# Patient Record
Sex: FEMALE | Race: WHITE | NOT HISPANIC OR LATINO | Employment: UNEMPLOYED | URBAN - NONMETROPOLITAN AREA
[De-identification: names, ages, dates, MRNs, and addresses within clinical notes are randomized per-mention and may not be internally consistent; named-entity substitution may affect disease eponyms.]

---

## 2018-11-01 ENCOUNTER — OFFICE VISIT (OUTPATIENT)
Dept: CARDIOLOGY | Facility: CLINIC | Age: 48
End: 2018-11-01

## 2018-11-01 VITALS
BODY MASS INDEX: 59.71 KG/M2 | HEART RATE: 89 BPM | SYSTOLIC BLOOD PRESSURE: 142 MMHG | DIASTOLIC BLOOD PRESSURE: 84 MMHG | OXYGEN SATURATION: 96 % | WEIGHT: 258 LBS | HEIGHT: 55 IN

## 2018-11-01 DIAGNOSIS — E78.2 MIXED HYPERLIPIDEMIA: ICD-10-CM

## 2018-11-01 DIAGNOSIS — I10 ESSENTIAL HYPERTENSION: ICD-10-CM

## 2018-11-01 DIAGNOSIS — Z91.89 MULTIPLE RISK FACTORS FOR CORONARY ARTERY DISEASE: ICD-10-CM

## 2018-11-01 DIAGNOSIS — R07.9 CHEST PAIN IN ADULT: Primary | ICD-10-CM

## 2018-11-01 DIAGNOSIS — Z72.0 TOBACCO USE: ICD-10-CM

## 2018-11-01 PROBLEM — Z82.49 FAMILY HISTORY OF PREMATURE CORONARY ARTERY DISEASE: Status: ACTIVE | Noted: 2018-11-01

## 2018-11-01 PROBLEM — E66.9 OBESITY: Status: ACTIVE | Noted: 2018-11-01

## 2018-11-01 PROBLEM — F41.1 GENERALIZED ANXIETY DISORDER: Status: ACTIVE | Noted: 2018-11-01

## 2018-11-01 PROCEDURE — 93000 ELECTROCARDIOGRAM COMPLETE: CPT | Performed by: INTERNAL MEDICINE

## 2018-11-01 PROCEDURE — 99204 OFFICE O/P NEW MOD 45 MIN: CPT | Performed by: INTERNAL MEDICINE

## 2018-11-01 RX ORDER — ERGOCALCIFEROL 1.25 MG/1
50000 CAPSULE ORAL WEEKLY
COMMUNITY

## 2018-11-01 RX ORDER — CITALOPRAM 20 MG/1
20 TABLET ORAL DAILY
COMMUNITY

## 2018-11-01 RX ORDER — HYDROCHLOROTHIAZIDE 12.5 MG/1
12.5 CAPSULE, GELATIN COATED ORAL DAILY
Qty: 90 CAPSULE | Refills: 3 | Status: SHIPPED | OUTPATIENT
Start: 2018-11-01 | End: 2022-02-09

## 2018-11-01 RX ORDER — BUPROPION HYDROCHLORIDE 150 MG/1
150 TABLET ORAL DAILY
COMMUNITY

## 2018-11-01 RX ORDER — CETIRIZINE HYDROCHLORIDE 10 MG/1
10 TABLET ORAL DAILY
COMMUNITY

## 2018-11-01 RX ORDER — ATORVASTATIN CALCIUM 20 MG/1
20 TABLET, FILM COATED ORAL DAILY
COMMUNITY

## 2018-11-01 RX ORDER — BUSPIRONE HYDROCHLORIDE 10 MG/1
10 TABLET ORAL 3 TIMES DAILY
COMMUNITY

## 2018-11-01 RX ORDER — ESCITALOPRAM OXALATE 20 MG/1
20 TABLET ORAL DAILY
COMMUNITY

## 2018-11-01 RX ORDER — LISINOPRIL 40 MG/1
40 TABLET ORAL DAILY
COMMUNITY
End: 2023-03-09

## 2018-11-01 RX ORDER — QUETIAPINE FUMARATE 100 MG/1
100 TABLET, FILM COATED ORAL NIGHTLY
COMMUNITY

## 2018-11-01 RX ORDER — AMLODIPINE BESYLATE 10 MG/1
10 TABLET ORAL DAILY
COMMUNITY

## 2018-11-01 RX ORDER — BUPRENORPHINE HYDROCHLORIDE AND NALOXONE HYDROCHLORIDE DIHYDRATE 8; 2 MG/1; MG/1
1 TABLET SUBLINGUAL DAILY
COMMUNITY

## 2018-11-01 NOTE — PROGRESS NOTES
Subjective   Chief Complaint   Patient presents with   • Hypertension   • New Patient       History of Present Illness  Patient is 48 years old white female who was multiple risk factors for premature coronary artery disease.  She states that she developed indigestion and heartburn in the epigastric area which radiated up the sternum.  It was accompanied by diaphoresis and shortness of breath.  There was no radiation of the pain to the arm.  It was not related to physical exertion.  Quality of the pain was tight burning feeling no sharp chest pain.  Severity was about 6 out of 10.  It does not regularly And with exercise.    Patient has multiple risk factors for coronary artery disease including   Obesity  Tobacco use  Essential hypertension  Upper lipidemia  And family history of premature coronary artery disease.  Her maternal grandmother  age 49 with massive MI.    Blood pressure is not very well controlled at this time  She went to the emergency room where blood pressure was high however she was treated for chest pain and was recommended to have a stress test done or blood pressure medications were not adjusted.    Lipids according to her are controlled with Lipitor.    Patient continues to smoke also.  She has lot of anxiety issues.    Past Surgical History:   Procedure Laterality Date   • ANKLE SURGERY     •  SECTION     • HYSTERECTOMY       Family History   Problem Relation Age of Onset   • Heart disease Mother      Past Medical History:   Diagnosis Date   • Depression    • Hypertension        Patient Active Problem List   Diagnosis   • Chest pain in adult   • Essential hypertension   • Mixed hyperlipidemia   • Generalized anxiety disorder   • Obesity   • Tobacco use   • Family history of premature coronary artery disease   • Multiple risk factors for coronary artery disease         Social History   Substance Use Topics   • Smoking status: Current Every Day Smoker     Packs/day: 0.50     Years:  15.00     Types: Cigarettes   • Smokeless tobacco: Never Used   • Alcohol use No         The following portions of the patient's history were reviewed and updated as appropriate: allergies, current medications, past family history, past medical history, past social history, past surgical history and problem list.    No Known Allergies      Current Outpatient Prescriptions:   •  amLODIPine (NORVASC) 10 MG tablet, Take 10 mg by mouth Daily., Disp: , Rfl:   •  atorvastatin (LIPITOR) 20 MG tablet, Take 20 mg by mouth Daily., Disp: , Rfl:   •  buprenorphine-naloxone (SUBOXONE) 8-2 MG per SL tablet, Place 1 tablet under the tongue Daily., Disp: , Rfl:   •  buPROPion XL (WELLBUTRIN XL) 150 MG 24 hr tablet, Take 150 mg by mouth Daily., Disp: , Rfl:   •  busPIRone (BUSPAR) 10 MG tablet, Take 10 mg by mouth 3 (Three) Times a Day., Disp: , Rfl:   •  cetirizine (zyrTEC) 10 MG tablet, Take 10 mg by mouth Daily., Disp: , Rfl:   •  citalopram (CeleXA) 20 MG tablet, Take 20 mg by mouth Daily., Disp: , Rfl:   •  escitalopram (LEXAPRO) 20 MG tablet, Take 20 mg by mouth Daily., Disp: , Rfl:   •  lisinopril (PRINIVIL,ZESTRIL) 40 MG tablet, Take 40 mg by mouth Daily., Disp: , Rfl:   •  Naloxegol Oxalate (MOVANTIK) 25 MG tablet, Take 25 mg by mouth Every Morning., Disp: , Rfl:   •  QUEtiapine (SEROquel) 100 MG tablet, Take 100 mg by mouth Every Night., Disp: , Rfl:   •  vitamin D (ERGOCALCIFEROL) 53947 units capsule capsule, Take 50,000 Units by mouth 1 (One) Time Per Week., Disp: , Rfl:   •  hydrochlorothiazide (MICROZIDE) 12.5 MG capsule, Take 1 capsule by mouth Daily., Disp: 90 capsule, Rfl: 3    Review of Systems   Constitution: Negative.   HENT: Negative.  Negative for congestion.    Eyes: Negative.    Cardiovascular: Positive for chest pain and dyspnea on exertion. Negative for cyanosis, irregular heartbeat, leg swelling, near-syncope, orthopnea, palpitations, paroxysmal nocturnal dyspnea and syncope.   Respiratory: Positive for  "shortness of breath.    Hematologic/Lymphatic: Negative.    Musculoskeletal: Negative.    Gastrointestinal: Positive for heartburn.   Neurological: Negative.  Negative for headaches.   Psychiatric/Behavioral: The patient is nervous/anxious.         Objective      /84 (BP Location: Left arm, Patient Position: Sitting)   Pulse 89   Ht 63 cm (24.8\")   Wt 117 kg (258 lb)   SpO2 96%   .86 kg/m²     Physical Exam   Constitutional: She appears well-developed and well-nourished. No distress.   HENT:   Head: Normocephalic and atraumatic.   Mouth/Throat: Oropharynx is clear and moist. No oropharyngeal exudate.   Eyes: Pupils are equal, round, and reactive to light. Conjunctivae and EOM are normal. No scleral icterus.   Neck: Normal range of motion. Neck supple. No JVD present. No tracheal deviation present. No thyromegaly present.   Cardiovascular: Normal rate, regular rhythm, normal heart sounds and intact distal pulses.  PMI is not displaced.  Exam reveals no gallop, no friction rub and no decreased pulses.    No murmur heard.  Pulses:       Carotid pulses are 3+ on the right side, and 3+ on the left side.       Radial pulses are 3+ on the right side, and 3+ on the left side.   Pulmonary/Chest: Effort normal and breath sounds normal. No respiratory distress. She has no wheezes. She has no rales. She exhibits no tenderness.   Abdominal: Soft. Bowel sounds are normal. She exhibits no distension, no abdominal bruit and no mass. There is no splenomegaly or hepatomegaly. There is no tenderness. There is no rebound and no guarding.   Musculoskeletal: Normal range of motion. She exhibits no edema, tenderness or deformity.   Lymphadenopathy:     She has no cervical adenopathy.   Neurological: She is alert. She has normal reflexes. No cranial nerve deficit. She exhibits normal muscle tone. Coordination normal.   Skin: Skin is warm and dry. No rash noted. She is not diaphoretic. No erythema.   Psychiatric: She has a " normal mood and affect. Her behavior is normal. Judgment and thought content normal.       Lab Review:      ECG 12 Lead  Date/Time: 11/1/2018 1:06 PM  Performed by: BRYCE ARBOLEDA  Authorized by: BRYCE ARBOLEDA   Rhythm: sinus rhythm  Rate: normal  BPM: 74  Conduction: conduction normal  ST Segments: ST segments normal  T Waves: T waves normal  QRS axis: normal  Other: no other findings  Clinical impression: normal ECG            I reviewed the patient's new clinical results.  I personally viewed and interpreted the patient's EKG/lab data        Assessment:   Diagnosis Plan   1. Chest pain in adult  ECG 12 Lead    ECG 12 Lead   2. Mixed hyperlipidemia     3. Essential hypertension     4. Tobacco use     5. Multiple risk factors for coronary artery disease            Plan:  Patient is 48 years old white female who is here for evaluation of chest pain.  She was seen in the emergency room and stress test was recommended.    Her blood pressure is also not very well controlled.  Patient has multiple cardiac risk factors.  Blood pressure medications were adjusted  Hydrochlorothiazide 12.5 mg was added.  Echo and stress test was scheduled for further evaluation.  Healthy lifestyle was discussed including weight loss, exercise program and cessation of tobacco use    Patient be reevaluated after studies are completed    Thank you for giving me the oppertunity to participate in your patient's cardiac care.    Sincerely,    SAVANNAH Arboleda M.D. FACP [facility]     No Follow-up on file.

## 2018-11-28 ENCOUNTER — APPOINTMENT (OUTPATIENT)
Dept: NUCLEAR MEDICINE | Facility: HOSPITAL | Age: 48
End: 2018-11-28
Attending: INTERNAL MEDICINE

## 2018-11-28 ENCOUNTER — APPOINTMENT (OUTPATIENT)
Dept: CARDIOLOGY | Facility: HOSPITAL | Age: 48
End: 2018-11-28
Attending: INTERNAL MEDICINE

## 2019-02-18 ENCOUNTER — OFFICE VISIT (OUTPATIENT)
Dept: CARDIOLOGY | Facility: CLINIC | Age: 49
End: 2019-02-18

## 2019-02-18 VITALS
BODY MASS INDEX: 46.78 KG/M2 | SYSTOLIC BLOOD PRESSURE: 152 MMHG | WEIGHT: 264 LBS | OXYGEN SATURATION: 95 % | HEART RATE: 86 BPM | DIASTOLIC BLOOD PRESSURE: 98 MMHG | HEIGHT: 63 IN

## 2019-02-18 DIAGNOSIS — E66.01 CLASS 3 SEVERE OBESITY WITHOUT SERIOUS COMORBIDITY WITH BODY MASS INDEX (BMI) OF 45.0 TO 49.9 IN ADULT, UNSPECIFIED OBESITY TYPE (HCC): ICD-10-CM

## 2019-02-18 DIAGNOSIS — Z72.0 TOBACCO USE: ICD-10-CM

## 2019-02-18 DIAGNOSIS — Z91.89 MULTIPLE RISK FACTORS FOR CORONARY ARTERY DISEASE: ICD-10-CM

## 2019-02-18 DIAGNOSIS — R07.9 CHEST PAIN IN ADULT: ICD-10-CM

## 2019-02-18 DIAGNOSIS — Z82.49 FAMILY HISTORY OF PREMATURE CORONARY ARTERY DISEASE: ICD-10-CM

## 2019-02-18 DIAGNOSIS — E78.2 MIXED HYPERLIPIDEMIA: ICD-10-CM

## 2019-02-18 DIAGNOSIS — I10 ESSENTIAL HYPERTENSION: Primary | ICD-10-CM

## 2019-02-18 PROCEDURE — 99214 OFFICE O/P EST MOD 30 MIN: CPT | Performed by: INTERNAL MEDICINE

## 2019-02-18 RX ORDER — LABETALOL 100 MG/1
50 TABLET, FILM COATED ORAL 2 TIMES DAILY
Qty: 90 TABLET | Refills: 0 | Status: SHIPPED | OUTPATIENT
Start: 2019-02-18 | End: 2019-03-25 | Stop reason: ALTCHOICE

## 2019-02-18 NOTE — PROGRESS NOTES
subjective     Chief Complaint   Patient presents with   • Chest Pain     Missed Appt for Stress and Echo      History of Present Illness  Patient is 48 years old white female who has history of chest pain.  Patient was scheduled to have echo and stress test done but she missed the appointment.    She states her blood pressure has been running high.  She was given hydrochlorothiazide last time.  She is not taking it because it did not diurese her very well.    Patient did not lose any weight  She continues to smoke  Patient has multiple cardiac risk factors including obesity, hypertension, hyperlipidemia, tobacco use and strong family history of premature coronary artery disease    Past Surgical History:   Procedure Laterality Date   • ANKLE SURGERY     •  SECTION     • HYSTERECTOMY       Family History   Problem Relation Age of Onset   • Heart disease Mother      Past Medical History:   Diagnosis Date   • Depression    • Hypertension      Patient Active Problem List   Diagnosis   • Chest pain in adult   • Essential hypertension   • Mixed hyperlipidemia   • Generalized anxiety disorder   • Obesity   • Tobacco use   • Family history of premature coronary artery disease   • Multiple risk factors for coronary artery disease       Social History     Tobacco Use   • Smoking status: Current Every Day Smoker     Packs/day: 0.50     Years: 15.00     Pack years: 7.50     Types: Cigarettes   • Smokeless tobacco: Never Used   Substance Use Topics   • Alcohol use: No   • Drug use: Yes     Types: Benzodiazepines       No Known Allergies    Current Outpatient Medications on File Prior to Visit   Medication Sig   • amLODIPine (NORVASC) 10 MG tablet Take 10 mg by mouth Daily.   • atorvastatin (LIPITOR) 20 MG tablet Take 20 mg by mouth Daily.   • buprenorphine-naloxone (SUBOXONE) 8-2 MG per SL tablet Place 1 tablet under the tongue Daily.   • buPROPion XL (WELLBUTRIN XL) 150 MG 24 hr tablet Take 150 mg by mouth Daily.   •  "busPIRone (BUSPAR) 10 MG tablet Take 10 mg by mouth 3 (Three) Times a Day.   • cetirizine (zyrTEC) 10 MG tablet Take 10 mg by mouth Daily.   • citalopram (CeleXA) 20 MG tablet Take 20 mg by mouth Daily.   • escitalopram (LEXAPRO) 20 MG tablet Take 20 mg by mouth Daily.   • hydrochlorothiazide (MICROZIDE) 12.5 MG capsule Take 1 capsule by mouth Daily.   • lisinopril (PRINIVIL,ZESTRIL) 40 MG tablet Take 40 mg by mouth Daily.   • Naloxegol Oxalate (MOVANTIK) 25 MG tablet Take 25 mg by mouth Every Morning.   • QUEtiapine (SEROquel) 100 MG tablet Take 100 mg by mouth Every Night.   • vitamin D (ERGOCALCIFEROL) 71760 units capsule capsule Take 50,000 Units by mouth 1 (One) Time Per Week.     No current facility-administered medications on file prior to visit.          The following portions of the patient's history were reviewed and updated as appropriate: allergies, current medications, past family history, past medical history, past social history, past surgical history and problem list.    Review of Systems   Constitution: Negative.   HENT: Negative.  Negative for congestion.    Eyes: Negative.    Cardiovascular: Positive for chest pain. Negative for cyanosis, dyspnea on exertion, irregular heartbeat, leg swelling, near-syncope, orthopnea, palpitations, paroxysmal nocturnal dyspnea and syncope.   Respiratory: Negative.  Negative for shortness of breath.    Hematologic/Lymphatic: Negative.    Musculoskeletal: Negative.    Gastrointestinal: Negative.    Neurological: Negative.  Negative for headaches.          Objective:     /98 (BP Location: Left arm, Patient Position: Sitting)   Pulse 86   Ht 160 cm (63\")   Wt 120 kg (264 lb)   SpO2 95%   BMI 46.77 kg/m²   Physical Exam   Constitutional: She appears well-developed and well-nourished. No distress.   HENT:   Head: Normocephalic and atraumatic.   Mouth/Throat: Oropharynx is clear and moist. No oropharyngeal exudate.   Eyes: Conjunctivae and EOM are normal. " Pupils are equal, round, and reactive to light. No scleral icterus.   Neck: Normal range of motion. Neck supple. No JVD present. No tracheal deviation present. No thyromegaly present.   Cardiovascular: Normal rate, regular rhythm, normal heart sounds and intact distal pulses. PMI is not displaced. Exam reveals no gallop, no friction rub and no decreased pulses.   No murmur heard.  Pulses:       Carotid pulses are 3+ on the right side, and 3+ on the left side.       Radial pulses are 3+ on the right side, and 3+ on the left side.   Pulmonary/Chest: Effort normal and breath sounds normal. No respiratory distress. She has no wheezes. She has no rales. She exhibits no tenderness.   Abdominal: Soft. Bowel sounds are normal. She exhibits no distension, no abdominal bruit and no mass. There is no splenomegaly or hepatomegaly. There is no tenderness. There is no rebound and no guarding.   Musculoskeletal: Normal range of motion. She exhibits no edema, tenderness or deformity.   Lymphadenopathy:     She has no cervical adenopathy.   Neurological: She is alert. She has normal reflexes. No cranial nerve deficit. She exhibits normal muscle tone. Coordination normal.   Skin: Skin is warm and dry. No rash noted. She is not diaphoretic. No erythema.   Psychiatric: She has a normal mood and affect. Her behavior is normal. Judgment and thought content normal.         Lab Review    Procedures       I personally viewed and interpreted the patient's LAB data         Assessment:     1. Essential hypertension    2. Mixed hyperlipidemia    3. Chest pain in adult    4. Tobacco use    5. Multiple risk factors for coronary artery disease    6. Family history of premature coronary artery disease    7. Class 3 severe obesity without serious comorbidity with body mass index (BMI) of 45.0 to 49.9 in adult, unspecified obesity type (CMS/HCC)          Plan:      Blood pressure is elevated.  Patient was advised to take lisinopril 20 twice a  day  Continue rest of her medications  Add labetalol 50 twice a day and hydrochlorothiazide 12.5 daily.    Echo and stress test were also scheduled.  Weight loss and cessation of tobacco use was also stressed.  Follow-up scheduled        No Follow-up on file.

## 2019-03-01 ENCOUNTER — HOSPITAL ENCOUNTER (OUTPATIENT)
Dept: CARDIOLOGY | Facility: HOSPITAL | Age: 49
Discharge: HOME | End: 2019-03-01
Admitting: INTERNAL MEDICINE

## 2019-03-01 DIAGNOSIS — R07.9 CHEST PAIN IN ADULT: ICD-10-CM

## 2019-03-01 DIAGNOSIS — Z91.89 MULTIPLE RISK FACTORS FOR CORONARY ARTERY DISEASE: ICD-10-CM

## 2019-03-01 PROCEDURE — 93306 TTE W/DOPPLER COMPLETE: CPT | Performed by: INTERNAL MEDICINE

## 2019-03-01 PROCEDURE — 93306 TTE W/DOPPLER COMPLETE: CPT

## 2019-03-04 LAB
BH CV ECHO MEAS - % IVS THICK: 28.9 %
BH CV ECHO MEAS - % LVPW THICK: 88.1 %
BH CV ECHO MEAS - ACS: 2.1 CM
BH CV ECHO MEAS - AO MAX PG: 13.2 MMHG
BH CV ECHO MEAS - AO MEAN PG: 6.4 MMHG
BH CV ECHO MEAS - AO ROOT AREA (BSA CORRECTED): 1.3
BH CV ECHO MEAS - AO ROOT AREA: 6.5 CM^2
BH CV ECHO MEAS - AO ROOT DIAM: 2.9 CM
BH CV ECHO MEAS - AO V2 MAX: 181.9 CM/SEC
BH CV ECHO MEAS - AO V2 MEAN: 115.2 CM/SEC
BH CV ECHO MEAS - AO V2 VTI: 32.5 CM
BH CV ECHO MEAS - BSA(HAYCOCK): 2.4 M^2
BH CV ECHO MEAS - BSA: 2.2 M^2
BH CV ECHO MEAS - BZI_BMI: 46.8 KILOGRAMS/M^2
BH CV ECHO MEAS - BZI_METRIC_HEIGHT: 160 CM
BH CV ECHO MEAS - BZI_METRIC_WEIGHT: 119.8 KG
BH CV ECHO MEAS - EDV(CUBED): 93 ML
BH CV ECHO MEAS - EDV(MOD-SP4): 63 ML
BH CV ECHO MEAS - EDV(TEICH): 93.9 ML
BH CV ECHO MEAS - EF(CUBED): 67 %
BH CV ECHO MEAS - EF(MOD-SP4): 74.6 %
BH CV ECHO MEAS - EF(TEICH): 58.7 %
BH CV ECHO MEAS - ESV(CUBED): 30.7 ML
BH CV ECHO MEAS - ESV(MOD-SP4): 16 ML
BH CV ECHO MEAS - ESV(TEICH): 38.8 ML
BH CV ECHO MEAS - FS: 30.9 %
BH CV ECHO MEAS - IVS/LVPW: 1.2
BH CV ECHO MEAS - IVSD: 1.2 CM
BH CV ECHO MEAS - IVSS: 1.5 CM
BH CV ECHO MEAS - LA DIMENSION: 3 CM
BH CV ECHO MEAS - LA/AO: 1
BH CV ECHO MEAS - LV DIASTOLIC VOL/BSA (35-75): 29 ML/M^2
BH CV ECHO MEAS - LV MASS(C)D: 169.6 GRAMS
BH CV ECHO MEAS - LV MASS(C)DI: 78 GRAMS/M^2
BH CV ECHO MEAS - LV MASS(C)S: 198 GRAMS
BH CV ECHO MEAS - LV MASS(C)SI: 91 GRAMS/M^2
BH CV ECHO MEAS - LV SYSTOLIC VOL/BSA (12-30): 7.4 ML/M^2
BH CV ECHO MEAS - LVIDD: 4.5 CM
BH CV ECHO MEAS - LVIDS: 3.1 CM
BH CV ECHO MEAS - LVLD AP4: 7.5 CM
BH CV ECHO MEAS - LVLS AP4: 5.9 CM
BH CV ECHO MEAS - LVOT AREA (M): 2.5 CM^2
BH CV ECHO MEAS - LVOT AREA: 2.6 CM^2
BH CV ECHO MEAS - LVOT DIAM: 1.8 CM
BH CV ECHO MEAS - LVPWD: 0.95 CM
BH CV ECHO MEAS - LVPWS: 1.8 CM
BH CV ECHO MEAS - MV A MAX VEL: 74 CM/SEC
BH CV ECHO MEAS - MV E MAX VEL: 86.6 CM/SEC
BH CV ECHO MEAS - MV E/A: 1.2
BH CV ECHO MEAS - PA ACC SLOPE: 1299 CM/SEC^2
BH CV ECHO MEAS - PA ACC TIME: 0.1 SEC
BH CV ECHO MEAS - PA PR(ACCEL): 36.2 MMHG
BH CV ECHO MEAS - SI(AO): 96.9 ML/M^2
BH CV ECHO MEAS - SI(CUBED): 28.6 ML/M^2
BH CV ECHO MEAS - SI(MOD-SP4): 21.6 ML/M^2
BH CV ECHO MEAS - SI(TEICH): 25.3 ML/M^2
BH CV ECHO MEAS - SV(AO): 210.8 ML
BH CV ECHO MEAS - SV(CUBED): 62.3 ML
BH CV ECHO MEAS - SV(MOD-SP4): 47 ML
BH CV ECHO MEAS - SV(TEICH): 55.1 ML
MAXIMAL PREDICTED HEART RATE: 171 BPM
STRESS TARGET HR: 145 BPM

## 2019-03-11 ENCOUNTER — TELEPHONE (OUTPATIENT)
Dept: CARDIOLOGY | Facility: CLINIC | Age: 49
End: 2019-03-11

## 2019-03-11 DIAGNOSIS — E66.01 CLASS 3 SEVERE OBESITY WITHOUT SERIOUS COMORBIDITY WITH BODY MASS INDEX (BMI) OF 45.0 TO 49.9 IN ADULT, UNSPECIFIED OBESITY TYPE (HCC): Primary | ICD-10-CM

## 2019-03-11 NOTE — TELEPHONE ENCOUNTER
----- Message from Alysha Arboleda MD sent at 3/11/2019 10:13 AM EDT -----  Echo is good but there is no stress test

## 2019-03-21 ENCOUNTER — HOSPITAL ENCOUNTER (OUTPATIENT)
Dept: CARDIOLOGY | Facility: HOSPITAL | Age: 49
Discharge: HOME | End: 2019-03-21
Admitting: INTERNAL MEDICINE

## 2019-03-21 DIAGNOSIS — E66.01 CLASS 3 SEVERE OBESITY WITHOUT SERIOUS COMORBIDITY WITH BODY MASS INDEX (BMI) OF 45.0 TO 49.9 IN ADULT, UNSPECIFIED OBESITY TYPE (HCC): ICD-10-CM

## 2019-03-21 PROCEDURE — 93018 CV STRESS TEST I&R ONLY: CPT | Performed by: INTERNAL MEDICINE

## 2019-03-21 PROCEDURE — 93017 CV STRESS TEST TRACING ONLY: CPT

## 2019-03-22 LAB
BH CV STRESS BP STAGE 1: NORMAL
BH CV STRESS BP STAGE 2: NORMAL
BH CV STRESS DURATION MIN STAGE 1: 3
BH CV STRESS DURATION MIN STAGE 2: 3
BH CV STRESS DURATION SEC STAGE 1: 0
BH CV STRESS DURATION SEC STAGE 2: 0
BH CV STRESS GRADE STAGE 1: 10
BH CV STRESS GRADE STAGE 2: 12
BH CV STRESS HR STAGE 1: 141
BH CV STRESS HR STAGE 2: 145
BH CV STRESS METS STAGE 1: 5
BH CV STRESS METS STAGE 2: 7.5
BH CV STRESS PROTOCOL 1: NORMAL
BH CV STRESS RECOVERY BP: NORMAL MMHG
BH CV STRESS RECOVERY HR: 93 BPM
BH CV STRESS SPEED STAGE 1: 1.7
BH CV STRESS SPEED STAGE 2: 2.5
BH CV STRESS STAGE 1: 1
BH CV STRESS STAGE 2: 2
MAXIMAL PREDICTED HEART RATE: 171 BPM
PERCENT MAX PREDICTED HR: 84.8 %
STRESS BASELINE BP: NORMAL MMHG
STRESS BASELINE HR: 97 BPM
STRESS PERCENT HR: 100 %
STRESS POST ESTIMATED WORKLOAD: 7 METS
STRESS POST EXERCISE DUR MIN: 4 MIN
STRESS POST EXERCISE DUR SEC: 1 SEC
STRESS POST PEAK BP: NORMAL MMHG
STRESS POST PEAK HR: 145 BPM
STRESS TARGET HR: 145 BPM

## 2019-03-25 ENCOUNTER — TELEPHONE (OUTPATIENT)
Dept: CARDIOLOGY | Facility: CLINIC | Age: 49
End: 2019-03-25

## 2019-03-25 RX ORDER — CARVEDILOL 6.25 MG/1
6.25 TABLET ORAL 2 TIMES DAILY
Qty: 60 TABLET | Refills: 3 | Status: SHIPPED | OUTPATIENT
Start: 2019-03-25 | End: 2019-04-03 | Stop reason: ALTCHOICE

## 2019-03-25 NOTE — TELEPHONE ENCOUNTER
Given sanam per Dr. Arboleda, D/C Labetolol and start on Coreg   She v/u.   RX sent to pharmacy.

## 2019-03-25 NOTE — TELEPHONE ENCOUNTER
----- Message from Alysha Arboleda MD sent at 3/22/2019 12:57 PM EDT -----  Stress test was normal but there is lot of heart skipping.  Stop labetalol  Start Coreg 6.125 twice daily

## 2019-03-26 ENCOUNTER — HOSPITAL ENCOUNTER (OUTPATIENT)
Dept: RESPIRATORY THERAPY | Facility: HOSPITAL | Age: 49
Discharge: HOME | End: 2019-03-26
Admitting: INTERNAL MEDICINE

## 2019-03-26 ENCOUNTER — OFFICE VISIT (OUTPATIENT)
Dept: CARDIOLOGY | Facility: CLINIC | Age: 49
End: 2019-03-26

## 2019-03-26 VITALS
SYSTOLIC BLOOD PRESSURE: 138 MMHG | WEIGHT: 265 LBS | BODY MASS INDEX: 46.95 KG/M2 | HEART RATE: 75 BPM | DIASTOLIC BLOOD PRESSURE: 80 MMHG | HEIGHT: 63 IN | OXYGEN SATURATION: 98 %

## 2019-03-26 DIAGNOSIS — I10 ESSENTIAL HYPERTENSION: ICD-10-CM

## 2019-03-26 DIAGNOSIS — Z82.49 FAMILY HISTORY OF PREMATURE CORONARY ARTERY DISEASE: ICD-10-CM

## 2019-03-26 DIAGNOSIS — R00.2 PALPITATIONS: ICD-10-CM

## 2019-03-26 DIAGNOSIS — R94.39 ABNORMAL STRESS TEST: ICD-10-CM

## 2019-03-26 DIAGNOSIS — I49.3 FREQUENT UNIFOCAL PVCS: Primary | ICD-10-CM

## 2019-03-26 DIAGNOSIS — E78.2 MIXED HYPERLIPIDEMIA: ICD-10-CM

## 2019-03-26 DIAGNOSIS — R07.9 CHEST PAIN IN ADULT: ICD-10-CM

## 2019-03-26 PROCEDURE — 93225 XTRNL ECG REC<48 HRS REC: CPT

## 2019-03-26 PROCEDURE — 99214 OFFICE O/P EST MOD 30 MIN: CPT | Performed by: INTERNAL MEDICINE

## 2019-03-26 PROCEDURE — 93226 XTRNL ECG REC<48 HR SCAN A/R: CPT

## 2019-04-03 ENCOUNTER — TELEPHONE (OUTPATIENT)
Dept: CARDIOLOGY | Facility: CLINIC | Age: 49
End: 2019-04-03

## 2019-04-03 PROCEDURE — 93227 XTRNL ECG REC<48 HR R&I: CPT | Performed by: INTERNAL MEDICINE

## 2019-04-03 NOTE — TELEPHONE ENCOUNTER
The patient reports she's only taking an 1/2 of the Coreg and it makes her so sleepy. She says she has no energy

## 2019-04-03 NOTE — TELEPHONE ENCOUNTER
She could discontinue Coreg and keep the appointment.  If she develops symptoms without the medicine she could come and have a early appointment

## 2019-04-03 NOTE — TELEPHONE ENCOUNTER
----- Message from Alysha Arboleda MD sent at 4/3/2019  9:23 AM EDT -----  Holter monitor shows a short run of tachycardia.  Continue Coreg

## 2019-05-02 ENCOUNTER — HOSPITAL ENCOUNTER (OUTPATIENT)
Dept: NUCLEAR MEDICINE | Facility: HOSPITAL | Age: 49
Discharge: HOME | End: 2019-05-02

## 2019-05-02 ENCOUNTER — HOSPITAL ENCOUNTER (OUTPATIENT)
Dept: CARDIOLOGY | Facility: HOSPITAL | Age: 49
Discharge: HOME | End: 2019-05-02

## 2019-05-02 DIAGNOSIS — R07.9 CHEST PAIN IN ADULT: ICD-10-CM

## 2019-05-02 DIAGNOSIS — R94.39 ABNORMAL STRESS TEST: ICD-10-CM

## 2019-05-02 DIAGNOSIS — Z82.49 FAMILY HISTORY OF PREMATURE CORONARY ARTERY DISEASE: ICD-10-CM

## 2019-05-02 PROCEDURE — A9500 TC99M SESTAMIBI: HCPCS | Performed by: INTERNAL MEDICINE

## 2019-05-02 PROCEDURE — 25010000002 REGADENOSON 0.4 MG/5ML SOLUTION: Performed by: INTERNAL MEDICINE

## 2019-05-02 PROCEDURE — 0 TECHNETIUM SESTAMIBI: Performed by: INTERNAL MEDICINE

## 2019-05-02 PROCEDURE — 93018 CV STRESS TEST I&R ONLY: CPT | Performed by: INTERNAL MEDICINE

## 2019-05-02 PROCEDURE — 78452 HT MUSCLE IMAGE SPECT MULT: CPT

## 2019-05-02 PROCEDURE — 78452 HT MUSCLE IMAGE SPECT MULT: CPT | Performed by: INTERNAL MEDICINE

## 2019-05-02 PROCEDURE — 93017 CV STRESS TEST TRACING ONLY: CPT

## 2019-05-02 RX ADMIN — TECHNETIUM TC 99M SESTAMIBI 1 DOSE: 1 INJECTION INTRAVENOUS at 09:38

## 2019-05-02 RX ADMIN — TECHNETIUM TC 99M SESTAMIBI 1 DOSE: 1 INJECTION INTRAVENOUS at 07:56

## 2019-05-02 RX ADMIN — REGADENOSON 0.4 MG: 0.08 INJECTION, SOLUTION INTRAVENOUS at 09:38

## 2019-05-03 LAB
BH CV NUCLEAR PRIOR STUDY: 3
BH CV STRESS BP STAGE 1: NORMAL
BH CV STRESS BP STAGE 2: NORMAL
BH CV STRESS COMMENTS STAGE 1: NORMAL
BH CV STRESS COMMENTS STAGE 2: NORMAL
BH CV STRESS DOSE REGADENOSON STAGE 1: 0.4
BH CV STRESS DURATION MIN STAGE 1: 0
BH CV STRESS DURATION MIN STAGE 2: 4
BH CV STRESS DURATION SEC STAGE 1: 10
BH CV STRESS DURATION SEC STAGE 2: 0
BH CV STRESS HR STAGE 1: 95
BH CV STRESS HR STAGE 2: 78
BH CV STRESS PROTOCOL 1: NORMAL
BH CV STRESS RECOVERY BP: NORMAL MMHG
BH CV STRESS RECOVERY HR: 78 BPM
BH CV STRESS STAGE 1: 1
BH CV STRESS STAGE 2: 2
LV EF NUC BP: 55 %
MAXIMAL PREDICTED HEART RATE: 171 BPM
PERCENT MAX PREDICTED HR: 55.56 %
STRESS BASELINE BP: NORMAL MMHG
STRESS BASELINE HR: 77 BPM
STRESS PERCENT HR: 65 %
STRESS POST PEAK BP: NORMAL MMHG
STRESS POST PEAK HR: 95 BPM
STRESS TARGET HR: 145 BPM

## 2019-05-07 ENCOUNTER — OFFICE VISIT (OUTPATIENT)
Dept: CARDIOLOGY | Facility: CLINIC | Age: 49
End: 2019-05-07

## 2019-05-07 VITALS
HEIGHT: 63 IN | OXYGEN SATURATION: 99 % | DIASTOLIC BLOOD PRESSURE: 78 MMHG | SYSTOLIC BLOOD PRESSURE: 134 MMHG | BODY MASS INDEX: 47.84 KG/M2 | HEART RATE: 83 BPM | WEIGHT: 270 LBS

## 2019-05-07 DIAGNOSIS — E78.2 MIXED HYPERLIPIDEMIA: ICD-10-CM

## 2019-05-07 DIAGNOSIS — R00.2 PALPITATIONS: ICD-10-CM

## 2019-05-07 DIAGNOSIS — I49.3 FREQUENT UNIFOCAL PVCS: ICD-10-CM

## 2019-05-07 DIAGNOSIS — I10 ESSENTIAL HYPERTENSION: Primary | ICD-10-CM

## 2019-05-07 PROCEDURE — 99214 OFFICE O/P EST MOD 30 MIN: CPT | Performed by: INTERNAL MEDICINE

## 2019-05-07 RX ORDER — METOPROLOL SUCCINATE 25 MG/1
25 TABLET, EXTENDED RELEASE ORAL DAILY
Qty: 30 TABLET | Refills: 3 | Status: SHIPPED | OUTPATIENT
Start: 2019-05-07 | End: 2022-02-09 | Stop reason: SDUPTHER

## 2019-05-07 NOTE — PROGRESS NOTES
subjective     Chief Complaint   Patient presents with   • Hypertension   • Results     Stress test      History of Present Illness  Patient is 49 years old white female who is here for cardiology follow-up.  Patient had cardiac work-up including a stress test Holter monitor and echocardiogram.  Patient had frequent PVCs and ventricular triplets.  She denies any chest pain but complains of being fatigued and tired.    Past Surgical History:   Procedure Laterality Date   • ANKLE SURGERY     •  SECTION     • HYSTERECTOMY       Family History   Problem Relation Age of Onset   • Heart disease Mother      Past Medical History:   Diagnosis Date   • Depression    • Hypertension      Patient Active Problem List   Diagnosis   • Chest pain in adult   • Essential hypertension   • Mixed hyperlipidemia   • Generalized anxiety disorder   • Obesity   • Tobacco use   • Family history of premature coronary artery disease   • Multiple risk factors for coronary artery disease   • Palpitations   • Abnormal stress test   • Frequent unifocal PVCs, ventricular triplets       Social History     Tobacco Use   • Smoking status: Current Every Day Smoker     Packs/day: 0.50     Years: 15.00     Pack years: 7.50     Types: Cigarettes   • Smokeless tobacco: Never Used   Substance Use Topics   • Alcohol use: No   • Drug use: Yes     Types: Benzodiazepines       No Known Allergies    Current Outpatient Medications on File Prior to Visit   Medication Sig   • amLODIPine (NORVASC) 10 MG tablet Take 10 mg by mouth Daily.   • atorvastatin (LIPITOR) 20 MG tablet Take 20 mg by mouth Daily.   • buprenorphine-naloxone (SUBOXONE) 8-2 MG per SL tablet Place 1 tablet under the tongue Daily.   • buPROPion XL (WELLBUTRIN XL) 150 MG 24 hr tablet Take 150 mg by mouth Daily.   • busPIRone (BUSPAR) 10 MG tablet Take 10 mg by mouth 3 (Three) Times a Day.   • cetirizine (zyrTEC) 10 MG tablet Take 10 mg by mouth Daily.   • citalopram (CeleXA) 20 MG tablet Take  "20 mg by mouth Daily.   • escitalopram (LEXAPRO) 20 MG tablet Take 20 mg by mouth Daily.   • hydrochlorothiazide (MICROZIDE) 12.5 MG capsule Take 1 capsule by mouth Daily.   • lisinopril (PRINIVIL,ZESTRIL) 40 MG tablet Take 40 mg by mouth Daily.   • Naloxegol Oxalate (MOVANTIK) 25 MG tablet Take 25 mg by mouth Every Morning.   • QUEtiapine (SEROquel) 100 MG tablet Take 100 mg by mouth Every Night.   • vitamin D (ERGOCALCIFEROL) 76300 units capsule capsule Take 50,000 Units by mouth 1 (One) Time Per Week.     No current facility-administered medications on file prior to visit.          The following portions of the patient's history were reviewed and updated as appropriate: allergies, current medications, past family history, past medical history, past social history, past surgical history and problem list.    Review of Systems   Constitution: Positive for malaise/fatigue.   HENT: Negative.  Negative for congestion.    Eyes: Negative.    Cardiovascular: Positive for palpitations. Negative for chest pain, cyanosis, dyspnea on exertion, irregular heartbeat, leg swelling, near-syncope, orthopnea, paroxysmal nocturnal dyspnea and syncope.   Respiratory: Positive for shortness of breath.    Hematologic/Lymphatic: Negative.    Musculoskeletal: Negative.    Gastrointestinal: Negative.    Neurological: Negative.  Negative for headaches.          Objective:     /78 (BP Location: Left arm, Patient Position: Sitting)   Pulse 83   Ht 160 cm (63\")   Wt 122 kg (270 lb)   SpO2 99%   BMI 47.83 kg/m²   Physical Exam   Constitutional: She appears well-developed and well-nourished. No distress.   HENT:   Head: Normocephalic and atraumatic.   Mouth/Throat: Oropharynx is clear and moist. No oropharyngeal exudate.   Eyes: Conjunctivae and EOM are normal. Pupils are equal, round, and reactive to light. No scleral icterus.   Neck: Normal range of motion. Neck supple. No JVD present. No tracheal deviation present. No thyromegaly " present.   Cardiovascular: Normal rate, regular rhythm, normal heart sounds and intact distal pulses. PMI is not displaced. Exam reveals no gallop, no friction rub and no decreased pulses.   No murmur heard.  Pulses:       Carotid pulses are 3+ on the right side, and 3+ on the left side.       Radial pulses are 3+ on the right side, and 3+ on the left side.   Pulmonary/Chest: Effort normal and breath sounds normal. No respiratory distress. She has no wheezes. She has no rales. She exhibits no tenderness.   Abdominal: Soft. Bowel sounds are normal. She exhibits no distension, no abdominal bruit and no mass. There is no splenomegaly or hepatomegaly. There is no tenderness. There is no rebound and no guarding.   Musculoskeletal: Normal range of motion. She exhibits no edema, tenderness or deformity.   Lymphadenopathy:     She has no cervical adenopathy.   Neurological: She is alert. She has normal reflexes. No cranial nerve deficit. She exhibits normal muscle tone. Coordination normal.   Skin: Skin is warm and dry. No rash noted. She is not diaphoretic. No erythema.   Psychiatric: She has a normal mood and affect. Her behavior is normal. Judgment and thought content normal.         Lab Review    Procedures    Lexiscan stress test was negative for significant exercise-induced myocardial ischemia    Holter monitor showed one episode of atrial tachycardia at 130 bpm.    Echocardiogram was normal      I personally viewed and interpreted the patient's LAB data         Assessment:     1. Essential hypertension    2. Mixed hyperlipidemia    3. Palpitations    4. Frequent unifocal PVCs, ventricular triplets          Plan:     Blood pressure seems doing very well controlled.  For tachycardia and arrhythmia she had tried Coreg which made her very weak.  Patient was advised to take Toprol-XL 25 mg daily Norvasc dose was decreased to 5 mg daily.  She will continue rest of her medications.  Follow-up scheduled        No Follow-up  on file.

## 2019-09-06 ENCOUNTER — TELEPHONE (OUTPATIENT)
Dept: CARDIOLOGY | Facility: CLINIC | Age: 49
End: 2019-09-06

## 2019-09-06 NOTE — TELEPHONE ENCOUNTER
Attempted to call patient to remind her to get labs performed prior to appointment.   No answer, no VM set up.

## 2019-09-10 ENCOUNTER — OFFICE VISIT (OUTPATIENT)
Dept: CARDIOLOGY | Facility: CLINIC | Age: 49
End: 2019-09-10

## 2019-09-10 VITALS
SYSTOLIC BLOOD PRESSURE: 126 MMHG | HEIGHT: 63 IN | OXYGEN SATURATION: 98 % | HEART RATE: 64 BPM | WEIGHT: 267 LBS | DIASTOLIC BLOOD PRESSURE: 92 MMHG | BODY MASS INDEX: 47.31 KG/M2

## 2019-09-10 DIAGNOSIS — E78.2 MIXED HYPERLIPIDEMIA: ICD-10-CM

## 2019-09-10 DIAGNOSIS — Z72.0 TOBACCO USE: ICD-10-CM

## 2019-09-10 DIAGNOSIS — I49.3 FREQUENT UNIFOCAL PVCS: ICD-10-CM

## 2019-09-10 DIAGNOSIS — I10 ESSENTIAL HYPERTENSION: Primary | ICD-10-CM

## 2019-09-10 DIAGNOSIS — E66.01 CLASS 3 SEVERE OBESITY WITHOUT SERIOUS COMORBIDITY WITH BODY MASS INDEX (BMI) OF 45.0 TO 49.9 IN ADULT, UNSPECIFIED OBESITY TYPE (HCC): ICD-10-CM

## 2019-09-10 PROCEDURE — 99214 OFFICE O/P EST MOD 30 MIN: CPT | Performed by: INTERNAL MEDICINE

## 2019-09-10 RX ORDER — CARVEDILOL 6.25 MG/1
TABLET ORAL
Refills: 3 | COMMUNITY
Start: 2019-07-05 | End: 2021-07-08

## 2019-09-10 NOTE — PROGRESS NOTES
subjective     Chief Complaint   Patient presents with   • Hypertension   • Results     labs     History of Present Illness  Patient is 49 years old white female who is here for follow-up.  Patient has essential hypertension she is taking Toprol-XL 25 daily, hydrochlorothiazide 12.5 daily, lisinopril 40 mg daily, Coreg 6.25 twice daily and Norvasc 10 mg daily.  Last visit patient was told to either take Toprol or Coreg but she is taking both.  She denies any palpitations    Patient has problem with weight control and has not been able to lose much.  She also continues to use tobacco.  Hyperlipidemia is being treated through her PCP with Lipitor 20 mg daily.  Patient has lab work done which will be reviewed today.    Past Surgical History:   Procedure Laterality Date   • ANKLE SURGERY     •  SECTION     • HYSTERECTOMY       Family History   Problem Relation Age of Onset   • Heart disease Mother      Past Medical History:   Diagnosis Date   • Depression    • Hypertension      Patient Active Problem List   Diagnosis   • Chest pain in adult   • Essential hypertension   • Mixed hyperlipidemia   • Generalized anxiety disorder   • Obesity   • Tobacco use   • Family history of premature coronary artery disease   • Multiple risk factors for coronary artery disease   • Palpitations   • Abnormal stress test   • Frequent unifocal PVCs, ventricular triplets       Social History     Tobacco Use   • Smoking status: Current Every Day Smoker     Packs/day: 0.50     Years: 15.00     Pack years: 7.50     Types: Cigarettes   • Smokeless tobacco: Never Used   Substance Use Topics   • Alcohol use: No   • Drug use: Yes     Types: Benzodiazepines       No Known Allergies    Current Outpatient Medications on File Prior to Visit   Medication Sig   • amLODIPine (NORVASC) 10 MG tablet Take 10 mg by mouth Daily.   • atorvastatin (LIPITOR) 20 MG tablet Take 20 mg by mouth Daily.   • buprenorphine-naloxone (SUBOXONE) 8-2 MG per SL tablet  "Place 1 tablet under the tongue Daily.   • buPROPion XL (WELLBUTRIN XL) 150 MG 24 hr tablet Take 150 mg by mouth Daily.   • busPIRone (BUSPAR) 10 MG tablet Take 10 mg by mouth 3 (Three) Times a Day.   • carvedilol (COREG) 6.25 MG tablet    • cetirizine (zyrTEC) 10 MG tablet Take 10 mg by mouth Daily.   • citalopram (CeleXA) 20 MG tablet Take 20 mg by mouth Daily.   • escitalopram (LEXAPRO) 20 MG tablet Take 20 mg by mouth Daily.   • hydrochlorothiazide (MICROZIDE) 12.5 MG capsule Take 1 capsule by mouth Daily.   • lisinopril (PRINIVIL,ZESTRIL) 40 MG tablet Take 40 mg by mouth Daily.   • metoprolol succinate XL (TOPROL-XL) 25 MG 24 hr tablet Take 1 tablet by mouth Daily.   • Naloxegol Oxalate (MOVANTIK) 25 MG tablet Take 25 mg by mouth Every Morning.   • QUEtiapine (SEROquel) 100 MG tablet Take 100 mg by mouth Every Night.   • vitamin D (ERGOCALCIFEROL) 01452 units capsule capsule Take 50,000 Units by mouth 1 (One) Time Per Week.     No current facility-administered medications on file prior to visit.          The following portions of the patient's history were reviewed and updated as appropriate: allergies, current medications, past family history, past medical history, past social history, past surgical history and problem list.    Review of Systems   Constitution: Negative.   HENT: Negative.  Negative for congestion.    Eyes: Negative.    Cardiovascular: Negative.  Negative for chest pain, cyanosis, dyspnea on exertion, irregular heartbeat, leg swelling, near-syncope, orthopnea, palpitations, paroxysmal nocturnal dyspnea and syncope.   Respiratory: Negative.  Negative for shortness of breath.    Hematologic/Lymphatic: Negative.    Musculoskeletal: Negative.    Gastrointestinal: Negative.    Neurological: Negative.  Negative for headaches.          Objective:     /92 (BP Location: Left arm, Patient Position: Sitting, Cuff Size: Large Adult)   Pulse 64   Ht 160 cm (63\")   Wt 121 kg (267 lb)   SpO2 98%   " BMI 47.30 kg/m²   Physical Exam   Constitutional: She appears well-developed and well-nourished. No distress.   HENT:   Head: Normocephalic and atraumatic.   Mouth/Throat: Oropharynx is clear and moist. No oropharyngeal exudate.   Eyes: Conjunctivae and EOM are normal. Pupils are equal, round, and reactive to light. No scleral icterus.   Neck: Normal range of motion. Neck supple. No JVD present. No tracheal deviation present. No thyromegaly present.   Cardiovascular: Normal rate, regular rhythm, normal heart sounds and intact distal pulses. PMI is not displaced. Exam reveals no gallop, no friction rub and no decreased pulses.   No murmur heard.  Pulses:       Carotid pulses are 3+ on the right side, and 3+ on the left side.       Radial pulses are 3+ on the right side, and 3+ on the left side.   Pulmonary/Chest: Effort normal and breath sounds normal. No respiratory distress. She has no wheezes. She has no rales. She exhibits no tenderness.   Abdominal: Soft. Bowel sounds are normal. She exhibits no distension, no abdominal bruit and no mass. There is no splenomegaly or hepatomegaly. There is no tenderness. There is no rebound and no guarding.   Musculoskeletal: Normal range of motion. She exhibits no edema, tenderness or deformity.   Lymphadenopathy:     She has no cervical adenopathy.   Neurological: She is alert. She has normal reflexes. No cranial nerve deficit. She exhibits normal muscle tone. Coordination normal.   Skin: Skin is warm and dry. No rash noted. She is not diaphoretic. No erythema.   Psychiatric: She has a normal mood and affect. Her behavior is normal. Judgment and thought content normal.         Lab Review          Procedures       I personally viewed and interpreted the patient's LAB data         Assessment:     1. Essential hypertension    2. Frequent unifocal PVCs, ventricular triplets    3. Mixed hyperlipidemia    4. Class 3 severe obesity without serious comorbidity with body mass index  (BMI) of 45.0 to 49.9 in adult, unspecified obesity type (CMS/HCC)    5. Tobacco use          Plan:     Blood pressure is better controlled.  She was advised to continue medications except she will need to use only one beta-blocker.  It was discussed with the patient that her Coreg is better for blood pressure and Toprol is probably slightly better for arrhythmia.  She will decide and take only one.  Weight loss was emphasized.  Cessation of tobacco use was also stressed.  Lipid control and thyroid functions are normal        No Follow-up on file.

## 2021-04-07 RX ORDER — OMEPRAZOLE 20 MG/1
20 CAPSULE, DELAYED RELEASE ORAL DAILY
COMMUNITY

## 2021-07-07 ENCOUNTER — OFFICE VISIT (OUTPATIENT)
Dept: CARDIOLOGY | Facility: CLINIC | Age: 51
End: 2021-07-07

## 2021-07-07 VITALS
TEMPERATURE: 98 F | HEART RATE: 76 BPM | BODY MASS INDEX: 51.91 KG/M2 | DIASTOLIC BLOOD PRESSURE: 88 MMHG | SYSTOLIC BLOOD PRESSURE: 120 MMHG | HEIGHT: 63 IN | WEIGHT: 293 LBS | OXYGEN SATURATION: 94 %

## 2021-07-07 DIAGNOSIS — E66.01 MORBID OBESITY WITH BMI OF 50.0-59.9, ADULT (HCC): ICD-10-CM

## 2021-07-07 DIAGNOSIS — I10 ESSENTIAL HYPERTENSION: ICD-10-CM

## 2021-07-07 DIAGNOSIS — Z91.89 MULTIPLE RISK FACTORS FOR CORONARY ARTERY DISEASE: ICD-10-CM

## 2021-07-07 DIAGNOSIS — Z72.0 TOBACCO USE: ICD-10-CM

## 2021-07-07 DIAGNOSIS — E78.2 MIXED HYPERLIPIDEMIA: ICD-10-CM

## 2021-07-07 DIAGNOSIS — Z82.49 FAMILY HISTORY OF PREMATURE CORONARY ARTERY DISEASE: ICD-10-CM

## 2021-07-07 DIAGNOSIS — R07.9 CHEST PAIN IN ADULT: Primary | ICD-10-CM

## 2021-07-07 PROCEDURE — 93000 ELECTROCARDIOGRAM COMPLETE: CPT | Performed by: INTERNAL MEDICINE

## 2021-07-07 PROCEDURE — 99214 OFFICE O/P EST MOD 30 MIN: CPT | Performed by: INTERNAL MEDICINE

## 2021-07-07 NOTE — PROGRESS NOTES
subjective     Chief Complaint   Patient presents with   • Hypertension   • Chest Pain     History of Present Illness  Winnie is 51 years old white female who is here for cardiac evaluation.  Patient states that she has been having chest tightness and pressure sensation in the substernal area.  It is 5-6 out of 10 in severity.  Is not related to exertion.  There is no radiation of the pain.  No nausea vomiting or diaphoresis.    She had a stress test done slightly over 2 years ago which was mildly abnormal.    She also has hypertension which is fairly well controlled.  She is taking Norvasc 10 mg daily, lisinopril 40 mg daily and Coreg 6.25 twice daily  Patient also has hyperlipidemia and has been taking Lipitor 20 mg daily    She also has multiple risk factors including ongoing tobacco use and obesity.  Blood pressure and lipids appear to be well controlled according to her.    Past Surgical History:   Procedure Laterality Date   • ANKLE SURGERY     •  SECTION     •  SECTION     • HYSTERECTOMY       Family History   Problem Relation Age of Onset   • Heart disease Mother    • Obesity Mother    • Hypertension Mother    • Heart attack Mother    • Cancer Mother 59        melanoma   • Obesity Sister    • Obesity Maternal Grandmother    • Hypertension Maternal Grandmother    • Heart attack Maternal Grandmother      Past Medical History:   Diagnosis Date   • Depression    • Hypertension      Patient Active Problem List   Diagnosis   • Chest pain in adult   • Essential hypertension   • Mixed hyperlipidemia   • Generalized anxiety disorder   • Obesity   • Tobacco use   • Family history of premature coronary artery disease   • Multiple risk factors for coronary artery disease   • Palpitations   • Abnormal stress test   • Frequent unifocal PVCs, ventricular triplets   • Morbid obesity with BMI of 50.0-59.9, adult (CMS/[facility])       Social History     Tobacco Use   • Smoking status: Current Every Day  Smoker     Packs/day: 1.00     Years: 15.00     Pack years: 15.00     Types: Cigarettes   • Smokeless tobacco: Never Used   Substance Use Topics   • Alcohol use: No   • Drug use: Yes     Types: Benzodiazepines       No Known Allergies    Current Outpatient Medications on File Prior to Visit   Medication Sig   • amLODIPine (NORVASC) 10 MG tablet Take 10 mg by mouth Daily.   • atorvastatin (LIPITOR) 20 MG tablet Take 20 mg by mouth Daily.   • buprenorphine-naloxone (SUBOXONE) 8-2 MG per SL tablet Place 1 tablet under the tongue Daily.   • buPROPion XL (WELLBUTRIN XL) 150 MG 24 hr tablet Take 150 mg by mouth Daily.   • busPIRone (BUSPAR) 10 MG tablet Take 10 mg by mouth 3 (Three) Times a Day.   • carvedilol (COREG) 6.25 MG tablet    • cetirizine (zyrTEC) 10 MG tablet Take 10 mg by mouth Daily.   • citalopram (CeleXA) 20 MG tablet Take 20 mg by mouth Daily.   • escitalopram (LEXAPRO) 20 MG tablet Take 20 mg by mouth Daily.   • hydrochlorothiazide (MICROZIDE) 12.5 MG capsule Take 1 capsule by mouth Daily.   • lisinopril (PRINIVIL,ZESTRIL) 40 MG tablet Take 40 mg by mouth Daily.   • metoprolol succinate XL (TOPROL-XL) 25 MG 24 hr tablet Take 1 tablet by mouth Daily.   • Naloxegol Oxalate (MOVANTIK) 25 MG tablet Take 25 mg by mouth Every Morning.   • omeprazole (priLOSEC) 20 MG capsule Take 20 mg by mouth Daily.   • QUEtiapine (SEROquel) 100 MG tablet Take 100 mg by mouth Every Night.   • vitamin D (ERGOCALCIFEROL) 01653 units capsule capsule Take 50,000 Units by mouth 1 (One) Time Per Week.     No current facility-administered medications on file prior to visit.         The following portions of the patient's history were reviewed and updated as appropriate: allergies, current medications, past family history, past medical history, past social history, past surgical history and problem list.    Review of Systems   Constitutional: Negative.   HENT: Negative.  Negative for congestion.    Eyes: Negative.    Cardiovascular:  "Positive for chest pain. Negative for cyanosis, dyspnea on exertion, irregular heartbeat, leg swelling, near-syncope, orthopnea, palpitations, paroxysmal nocturnal dyspnea and syncope.   Respiratory: Negative.  Negative for shortness of breath.    Hematologic/Lymphatic: Negative.    Musculoskeletal: Negative.    Gastrointestinal: Negative.    Neurological: Negative.  Negative for headaches.          Objective:     /88 (BP Location: Left arm, Patient Position: Sitting, Cuff Size: Adult)   Pulse 76   Temp 98 °F (36.7 °C)   Ht 160 cm (63\")   Wt (!) 140 kg (309 lb 9.6 oz)   SpO2 94%   BMI 54.84 kg/m²   Pulmonary:      Effort: Pulmonary effort is normal.      Breath sounds: Normal breath sounds. No stridor. No wheezing. No rhonchi. No rales.   Cardiovascular:      PMI at left midclavicular line. Normal rate. Regular rhythm. Normal S1. Normal S2.      Murmurs: There is no murmur.      No gallop. No click. No rub.   Pulses:     Intact distal pulses.   Edema:     Peripheral edema absent.           Lab Review      ECG 12 Lead    Date/Time: 7/7/2021 5:19 PM  Performed by: Alysha Arboleda MD  Authorized by: Alysha Arboleda MD   Comparison: compared with previous ECG from 11/1/2018  Comparison to previous ECG: T wave inversion in V2 is new  Rhythm: sinus rhythm  Rate: normal  BPM: 76  Conduction: conduction normal  QRS axis: normal  Other findings: non-specific ST-T wave changes    Clinical impression: abnormal EKG               I personally viewed and interpreted the patient's LAB data         Assessment:     1. Chest pain in adult    2. Essential hypertension    3. Mixed hyperlipidemia    4. Tobacco use    5. Multiple risk factors for coronary artery disease    6. Morbid obesity with BMI of 50.0-59.9, adult (CMS/[facility])    7. Family history of premature coronary artery disease          Plan:     Patient is 51 years old white female who has multiple risk factors for coronary artery disease.  She " continues to smoke and also has gained more weight.  Patient has strong family history of premature coronary artery disease and also has hyperlipidemia and hypertension.  She complains of chest discomfort, last stress test was mildly abnormal .  With increasing chest pain further cardiac work-up is recommended.  Patient was advised to lose weight and stop smoking.  Echocardiogram and stress test was scheduled for further evaluation.  Lab work will be obtained from PCP.        No follow-ups on file.

## 2021-08-02 ENCOUNTER — APPOINTMENT (OUTPATIENT)
Dept: NUCLEAR MEDICINE | Facility: HOSPITAL | Age: 51
End: 2021-08-02

## 2021-08-02 ENCOUNTER — APPOINTMENT (OUTPATIENT)
Dept: CARDIOLOGY | Facility: HOSPITAL | Age: 51
End: 2021-08-02

## 2021-08-25 ENCOUNTER — HOSPITAL ENCOUNTER (OUTPATIENT)
Dept: NUCLEAR MEDICINE | Facility: HOSPITAL | Age: 51
End: 2021-08-25

## 2021-08-25 ENCOUNTER — HOSPITAL ENCOUNTER (OUTPATIENT)
Dept: CARDIOLOGY | Facility: HOSPITAL | Age: 51
Discharge: HOME | End: 2021-08-25

## 2021-08-25 ENCOUNTER — TELEPHONE (OUTPATIENT)
Dept: CARDIOLOGY | Facility: CLINIC | Age: 51
End: 2021-08-25

## 2021-08-25 DIAGNOSIS — Z72.0 TOBACCO USE: ICD-10-CM

## 2021-08-25 DIAGNOSIS — E66.01 MORBID OBESITY WITH BMI OF 50.0-59.9, ADULT (HCC): ICD-10-CM

## 2021-08-25 DIAGNOSIS — I10 ESSENTIAL HYPERTENSION: ICD-10-CM

## 2021-08-25 DIAGNOSIS — Z91.89 MULTIPLE RISK FACTORS FOR CORONARY ARTERY DISEASE: ICD-10-CM

## 2021-08-25 DIAGNOSIS — R07.9 CHEST PAIN IN ADULT: ICD-10-CM

## 2021-08-25 DIAGNOSIS — E78.2 MIXED HYPERLIPIDEMIA: ICD-10-CM

## 2021-08-25 LAB
BH CV ECHO MEAS - ACS: 2.1 CM
BH CV ECHO MEAS - AO ROOT AREA (BSA CORRECTED): 1.4
BH CV ECHO MEAS - AO ROOT AREA: 8 CM^2
BH CV ECHO MEAS - AO ROOT DIAM: 3.2 CM
BH CV ECHO MEAS - BSA(HAYCOCK): 2.6 M^2
BH CV ECHO MEAS - BSA: 2.3 M^2
BH CV ECHO MEAS - BZI_BMI: 54.7 KILOGRAMS/M^2
BH CV ECHO MEAS - BZI_METRIC_HEIGHT: 160 CM
BH CV ECHO MEAS - BZI_METRIC_WEIGHT: 140.2 KG
BH CV ECHO MEAS - EDV(CUBED): 46.7 ML
BH CV ECHO MEAS - EDV(TEICH): 54.4 ML
BH CV ECHO MEAS - EF(CUBED): 23 %
BH CV ECHO MEAS - EF(TEICH): 18.9 %
BH CV ECHO MEAS - ESV(CUBED): 35.9 ML
BH CV ECHO MEAS - ESV(TEICH): 44.1 ML
BH CV ECHO MEAS - FS: 8.3 %
BH CV ECHO MEAS - IVS/LVPW: 1
BH CV ECHO MEAS - IVSD: 1.6 CM
BH CV ECHO MEAS - LA DIMENSION: 4.1 CM
BH CV ECHO MEAS - LA/AO: 1.3
BH CV ECHO MEAS - LV MASS(C)D: 223.4 GRAMS
BH CV ECHO MEAS - LV MASS(C)DI: 96 GRAMS/M^2
BH CV ECHO MEAS - LVIDD: 3.6 CM
BH CV ECHO MEAS - LVIDS: 3.3 CM
BH CV ECHO MEAS - LVOT AREA (M): 3.8 CM^2
BH CV ECHO MEAS - LVOT AREA: 3.8 CM^2
BH CV ECHO MEAS - LVOT DIAM: 2.2 CM
BH CV ECHO MEAS - LVPWD: 1.6 CM
BH CV ECHO MEAS - MV A MAX VEL: 78.3 CM/SEC
BH CV ECHO MEAS - MV E MAX VEL: 125 CM/SEC
BH CV ECHO MEAS - MV E/A: 1.6
BH CV ECHO MEAS - PA ACC TIME: 0.08 SEC
BH CV ECHO MEAS - PA PR(ACCEL): 44.4 MMHG
BH CV ECHO MEAS - SI(CUBED): 4.6 ML/M^2
BH CV ECHO MEAS - SI(TEICH): 4.4 ML/M^2
BH CV ECHO MEAS - SV(CUBED): 10.7 ML
BH CV ECHO MEAS - SV(TEICH): 10.3 ML
MAXIMAL PREDICTED HEART RATE: 168 BPM
STRESS TARGET HR: 143 BPM

## 2021-08-25 PROCEDURE — 93306 TTE W/DOPPLER COMPLETE: CPT | Performed by: INTERNAL MEDICINE

## 2021-08-25 PROCEDURE — 93306 TTE W/DOPPLER COMPLETE: CPT

## 2021-08-25 NOTE — TELEPHONE ENCOUNTER
----- Message from Alysha Arboleda MD sent at 8/25/2021  4:29 PM EDT -----  Echocardiogram is good

## 2021-08-27 ENCOUNTER — APPOINTMENT (OUTPATIENT)
Dept: NUCLEAR MEDICINE | Facility: HOSPITAL | Age: 51
End: 2021-08-27

## 2021-08-27 ENCOUNTER — APPOINTMENT (OUTPATIENT)
Dept: CARDIOLOGY | Facility: HOSPITAL | Age: 51
End: 2021-08-27

## 2021-09-01 ENCOUNTER — HOSPITAL ENCOUNTER (OUTPATIENT)
Dept: NUCLEAR MEDICINE | Facility: HOSPITAL | Age: 51
Discharge: HOME | End: 2021-09-01

## 2021-09-01 ENCOUNTER — HOSPITAL ENCOUNTER (OUTPATIENT)
Dept: CARDIOLOGY | Facility: HOSPITAL | Age: 51
Discharge: HOME | End: 2021-09-01

## 2021-09-01 LAB
BH CV NUCLEAR PRIOR STUDY: 3
BH CV REST NUCLEAR ISOTOPE DOSE: 9.5 MCI
BH CV STRESS BP STAGE 1: NORMAL
BH CV STRESS BP STAGE 2: NORMAL
BH CV STRESS COMMENTS STAGE 1: NORMAL
BH CV STRESS COMMENTS STAGE 2: NORMAL
BH CV STRESS DOSE REGADENOSON STAGE 1: 0.4
BH CV STRESS DURATION MIN STAGE 1: 0
BH CV STRESS DURATION MIN STAGE 2: 4
BH CV STRESS DURATION SEC STAGE 1: 10
BH CV STRESS DURATION SEC STAGE 2: 0
BH CV STRESS HR STAGE 1: 89
BH CV STRESS HR STAGE 2: 87
BH CV STRESS NUCLEAR ISOTOPE DOSE: 31.1 MCI
BH CV STRESS PROTOCOL 1: NORMAL
BH CV STRESS RECOVERY BP: NORMAL MMHG
BH CV STRESS RECOVERY HR: 79 BPM
BH CV STRESS STAGE 1: 1
BH CV STRESS STAGE 2: 2
LV EF NUC BP: 75 %
MAXIMAL PREDICTED HEART RATE: 168 BPM
PERCENT MAX PREDICTED HR: 51.79 %
STRESS BASELINE BP: NORMAL MMHG
STRESS BASELINE HR: 73 BPM
STRESS PERCENT HR: 61 %
STRESS POST PEAK BP: NORMAL MMHG
STRESS POST PEAK HR: 87 BPM
STRESS TARGET HR: 143 BPM

## 2021-09-01 PROCEDURE — 93018 CV STRESS TEST I&R ONLY: CPT | Performed by: INTERNAL MEDICINE

## 2021-09-01 PROCEDURE — 25010000002 REGADENOSON 0.4 MG/5ML SOLUTION: Performed by: INTERNAL MEDICINE

## 2021-09-01 PROCEDURE — A9500 TC99M SESTAMIBI: HCPCS | Performed by: INTERNAL MEDICINE

## 2021-09-01 PROCEDURE — 78452 HT MUSCLE IMAGE SPECT MULT: CPT | Performed by: INTERNAL MEDICINE

## 2021-09-01 PROCEDURE — 0 TECHNETIUM SESTAMIBI: Performed by: INTERNAL MEDICINE

## 2021-09-01 PROCEDURE — 78452 HT MUSCLE IMAGE SPECT MULT: CPT

## 2021-09-01 PROCEDURE — 93017 CV STRESS TEST TRACING ONLY: CPT

## 2021-09-01 RX ADMIN — REGADENOSON 0.4 MG: 0.08 INJECTION, SOLUTION INTRAVENOUS at 09:21

## 2021-09-01 RX ADMIN — TECHNETIUM TC 99M SESTAMIBI 1 DOSE: 1 INJECTION INTRAVENOUS at 07:32

## 2021-09-01 RX ADMIN — TECHNETIUM TC 99M SESTAMIBI 1 DOSE: 1 INJECTION INTRAVENOUS at 09:21

## 2021-09-02 ENCOUNTER — TELEPHONE (OUTPATIENT)
Dept: CARDIOLOGY | Facility: CLINIC | Age: 51
End: 2021-09-02

## 2021-09-02 NOTE — TELEPHONE ENCOUNTER
----- Message from Alysha Arboleda MD sent at 9/2/2021 11:06 AM EDT -----  Stress test is  abnormal, recommend coronary angiography.Schedule if patient agrees

## 2021-10-04 ENCOUNTER — TELEPHONE (OUTPATIENT)
Dept: CARDIOLOGY | Facility: CLINIC | Age: 51
End: 2021-10-04

## 2021-10-04 NOTE — TELEPHONE ENCOUNTER
CHIEF COMPLAINT:Patient is a 48y old  Male who presents with a chief complaint of chest pain x few hours (04 Mar 2018 16:57)      HISTORY OF PRESENT ILLNESS:   HPI:  47 yo M with HTN, DM II, DLD and questionable CAD presented with acute onset chest pain that started around noon today while getting ready for work.  pt describes the pain as pulling in nature, left sided, non radiating, partially relieved with nitro, no associated N/V/diaphoresis. pt denies any cough, palpitations or syncopal episodes.  pt reports having similar pain in the past. he had a L heart cath 2 yrs ago (6/2016) in [facility] and revealed "no coronary obstruction but reduced flow", no stents were placed. pt is compliant with his meds. he denies any PERDOMO or exertional chest pain    PAST MEDICAL & SURGICAL HISTORY:  CAD (coronary artery disease)  Hypercholesteremia  Diabetes  Essential hypertension  H/O percutaneous left heart catheterization    FAMILY HISTORY:  No pertinent family history in first degree relatives    Allergies    Allergy Status Unknown    Intolerances    	  Home Medications:  Aspirin Low Dose 81 mg oral delayed release tablet: 1 tab(s) orally once a day (04 Mar 2018 17:09)  Cartia  mg/24 hours oral capsule, extended release: 1 cap(s) orally once a day (04 Mar 2018 17:09)  isosorbide mononitrate 60 mg oral tablet, extended release: 1 tab(s) orally once a day (in the morning) (04 Mar 2018 17:09)  Lipitor 40 mg oral tablet: 1 tab(s) orally once a day (04 Mar 2018 17:09)  losartan 25 mg oral tablet: 1 tab(s) orally once a day (04 Mar 2018 17:09)  Oseni 25 mg-45 mg oral tablet: 1 tab(s) orally once a day (04 Mar 2018 17:09)  pantoprazole 40 mg oral delayed release tablet: 1 tab(s) orally once a day (04 Mar 2018 17:09)  Trulicity Pen 0.75 mg/0.5 mL subcutaneous solution: subcutaneous once a week (saturday or sunday) (04 Mar 2018 17:09)    MEDICATIONS  (STANDING):  aspirin enteric coated 81 milliGRAM(s) Oral daily  atorvastatin 40 milliGRAM(s) Oral at bedtime  diltiazem    milliGRAM(s) Oral daily  enoxaparin Injectable 40 milliGRAM(s) SubCutaneous every 24 hours  isosorbide   mononitrate ER Tablet (IMDUR) 60 milliGRAM(s) Oral daily  losartan 25 milliGRAM(s) Oral daily  pantoprazole    Tablet 40 milliGRAM(s) Oral before breakfast    MEDICATIONS  (PRN):  acetaminophen   Tablet. 650 milliGRAM(s) Oral every 6 hours PRN Mild Pain (1 - 3)  nitroglycerin     SubLingual 0.4 milliGRAM(s) SubLingual every 5 minutes PRN Chest Pain        SOCIAL HISTORY:    [ x ] active smoker-cigars only  [  ] ex-smoker  [  ] never smoker  [  ] Etoh  [  ] recreational drugs    REVIEW OF SYSTEMS:  14 point ROS negative except as mentioned above in HPI    PHYSICAL EXAM:  T(C): 36.6 (03-05-18 @ 06:35), Max: 37 (03-04-18 @ 22:30)  HR: 69 (03-05-18 @ 06:35) (63 - 78)  BP: 100/57 (03-05-18 @ 06:35) (97/60 - 110/62)  RR: 18 (03-05-18 @ 06:35) (17 - 18)  SpO2: 98% (03-05-18 @ 06:35) (97% - 100%)  Wt(kg): --  I&O's Summary    Daily     Daily     General Appearance: in NAD	  Cardiovascular: Normal S1 S2, No JVD, No murmurs  Respiratory: cta b/l  Gastrointestinal:  Soft, Non-tender, BS +  Skin: No rashes, No ecchymoses, No cyanosis	  Neurologic: No focal deficits, AAOx3  Extremities: Normal ROM, No clubbing, cyanosis or edema  Vascular: Peripheral pulses palpable 2+ bilaterally    LABS:	 	                        15.2   6.93  )-----------( 248      ( 04 Mar 2018 15:23 )             43.4     03-04    139  |  104  |  9<L>  ----------------------------<  108  4.2   |  28  |  1.0    Ca    9.1      04 Mar 2018 15:23    TPro  6.5  /  Alb  4.1  /  TBili  1.0  /  DBili  x   /  AST  27  /  ALT  33  /  AlkPhos  34  03-04            CARDIAC MARKERS:    03-04-18 @ 21:06  TROPONINS  <0.02 ng/mL  CKMB  --  CREATINE KINASE  --  03-04-18 @ 15:23  TROPONINS  <0.02 ng/mL  CKMB  --  CREATINE KINASE  114 U/L      TELEMETRY EVENTS: 	    ECG:  	< from: 12 Lead ECG (03.04.18 @ 14:28) >    Diagnosis Line Normal sinus rhythm  Septal infarct , age undetermined Possible  Nonspecific T wave abnormality  Abnormal ECG    Confirmed by FANNIE LE, GRACIELA ([id number]) on 3/4/2018 4:49:14 PM    < end of copied text > Left message on machine to return call to schedule [facility]   CHIEF COMPLAINT:Patient is a 48y old  Male who presents with a chief complaint of chest pain x few hours (04 Mar 2018 16:57)      HISTORY OF PRESENT ILLNESS:   HPI:  47 yo M with HTN, DM II, DLD and questionable CAD presented with acute onset chest pain that started around noon today while getting ready for work.  pt describes the pain as pulling in nature, left sided, non radiating, partially relieved with nitro, no associated N/V/diaphoresis. pt denies any cough, palpitations or syncopal episodes.  pt reports having similar pain in the past. he had a L heart cath 2 yrs ago (6/2016) in [facility] and revealed "no coronary obstruction but reduced flow", no stents were placed. pt is compliant with his meds. he denies any PERDOMO or exertional chest pain    PAST MEDICAL & SURGICAL HISTORY:  CAD (coronary artery disease)  Hypercholesteremia  Diabetes  Essential hypertension  H/O  left heart catheterization    FAMILY HISTORY:  No pertinent family history in first degree relatives    Allergies    Allergy Status Unknown    Intolerances    	  Home Medications:  Aspirin Low Dose 81 mg oral delayed release tablet: 1 tab(s) orally once a day (04 Mar 2018 17:09)  Cartia  mg/24 hours oral capsule, extended release: 1 cap(s) orally once a day (04 Mar 2018 17:09)  isosorbide mononitrate 60 mg oral tablet, extended release: 1 tab(s) orally once a day (in the morning) (04 Mar 2018 17:09)  Lipitor 40 mg oral tablet: 1 tab(s) orally once a day (04 Mar 2018 17:09)  losartan 25 mg oral tablet: 1 tab(s) orally once a day (04 Mar 2018 17:09)  Oseni 25 mg-45 mg oral tablet: 1 tab(s) orally once a day (04 Mar 2018 17:09)  pantoprazole 40 mg oral delayed release tablet: 1 tab(s) orally once a day (04 Mar 2018 17:09)  Trulicity Pen 0.75 mg/0.5 mL subcutaneous solution: subcutaneous once a week (saturday or sunday) (04 Mar 2018 17:09)    MEDICATIONS  (STANDING):  aspirin enteric coated 81 milliGRAM(s) Oral daily  atorvastatin 40 milliGRAM(s) Oral at bedtime  diltiazem    milliGRAM(s) Oral daily  enoxaparin Injectable 40 milliGRAM(s) SubCutaneous every 24 hours  isosorbide   mononitrate ER Tablet (IMDUR) 60 milliGRAM(s) Oral daily  losartan 25 milliGRAM(s) Oral daily  pantoprazole    Tablet 40 milliGRAM(s) Oral before breakfast    MEDICATIONS  (PRN):  acetaminophen   Tablet. 650 milliGRAM(s) Oral every 6 hours PRN Mild Pain (1 - 3)  nitroglycerin     SubLingual 0.4 milliGRAM(s) SubLingual every 5 minutes PRN Chest Pain        SOCIAL HISTORY:    [ x ] active smoker-cigars only  [  ] ex-smoker  [  ] never smoker  [  ] Etoh  [  ] recreational drugs    REVIEW OF SYSTEMS:  14 point ROS negative except as mentioned above in HPI    PHYSICAL EXAM:  T(C): 36.6 (03-05-18 @ 06:35), Max: 37 (03-04-18 @ 22:30)  HR: 69 (03-05-18 @ 06:35) (63 - 78)  BP: 100/57 (03-05-18 @ 06:35) (97/60 - 110/62)  RR: 18 (03-05-18 @ 06:35) (17 - 18)  SpO2: 98% (03-05-18 @ 06:35) (97% - 100%)  Wt(kg): --  I&O's Summary    Daily     Daily     General Appearance: in NAD	  Cardiovascular: Normal S1 S2, No JVD, No murmurs  Respiratory: cta b/l  Gastrointestinal:  Soft, Non-tender, BS +  Skin: No rashes, No ecchymoses, No cyanosis	  Neurologic: No focal deficits, AAOx3  Extremities: Normal ROM, No clubbing, cyanosis or edema  Vascular: Peripheral pulses palpable 2+ bilaterally    LABS:	 	                        15.2   6.93  )-----------( 248      ( 04 Mar 2018 15:23 )             43.4     03-04    139  |  104  |  9<L>  ----------------------------<  108  4.2   |  28  |  1.0    Ca    9.1      04 Mar 2018 15:23    TPro  6.5  /  Alb  4.1  /  TBili  1.0  /  DBili  x   /  AST  27  /  ALT  33  /  AlkPhos  34  03-04            CARDIAC MARKERS:    03-04-18 @ 21:06  TROPONINS  <0.02 ng/mL  CKMB  --  CREATINE KINASE  --  03-04-18 @ 15:23  TROPONINS  <0.02 ng/mL  CKMB  --  CREATINE KINASE  114 U/L      TELEMETRY EVENTS: 	    ECG:  	< from: 12 Lead ECG (03.04.18 @ 14:28) >    Diagnosis Line Normal sinus rhythm  Septal infarct , age undetermined Possible  Nonspecific T wave abnormality  Abnormal ECG    Confirmed by FANNIE LE, GRACIELA ([id number]) on 3/4/2018 4:49:14 PM    < end of copied text >

## 2021-10-06 ENCOUNTER — TRANSCRIBE ORDERS (OUTPATIENT)
Dept: ADMINISTRATIVE | Facility: HOSPITAL | Age: 51
End: 2021-10-06

## 2021-10-06 DIAGNOSIS — R07.9 CHEST PAIN IN ADULT: Primary | ICD-10-CM

## 2021-10-06 DIAGNOSIS — Z01.818 OTHER SPECIFIED PRE-OPERATIVE EXAMINATION: Primary | ICD-10-CM

## 2021-10-06 DIAGNOSIS — Z01.818 OTHER SPECIFIED PRE-OPERATIVE EXAMINATION: ICD-10-CM

## 2021-10-06 DIAGNOSIS — R94.39 ABNORMAL NUCLEAR STRESS TEST: ICD-10-CM

## 2021-10-06 DIAGNOSIS — I10 ESSENTIAL HYPERTENSION: ICD-10-CM

## 2021-10-06 DIAGNOSIS — Z91.89 MULTIPLE RISK FACTORS FOR CORONARY ARTERY DISEASE: ICD-10-CM

## 2021-10-06 DIAGNOSIS — E78.2 MIXED HYPERLIPIDEMIA: ICD-10-CM

## 2021-10-25 ENCOUNTER — LAB (OUTPATIENT)
Dept: LAB | Facility: HOSPITAL | Age: 51
End: 2021-10-25

## 2021-10-25 DIAGNOSIS — Z01.818 OTHER SPECIFIED PRE-OPERATIVE EXAMINATION: ICD-10-CM

## 2021-10-25 PROCEDURE — U0004 COV-19 TEST NON-CDC HGH THRU: HCPCS | Performed by: INTERNAL MEDICINE

## 2021-10-25 PROCEDURE — C9803 HOPD COVID-19 SPEC COLLECT: HCPCS

## 2021-10-26 LAB — SARS-COV-2 RNA PNL SPEC NAA+PROBE: NOT DETECTED

## 2021-10-27 ENCOUNTER — HOSPITAL ENCOUNTER (OUTPATIENT)
Facility: HOSPITAL | Age: 51
Discharge: HOME | End: 2021-10-27
Attending: INTERNAL MEDICINE | Admitting: INTERNAL MEDICINE

## 2021-10-27 VITALS
BODY MASS INDEX: 51.91 KG/M2 | HEIGHT: 63 IN | OXYGEN SATURATION: 95 % | RESPIRATION RATE: 17 BRPM | HEART RATE: 77 BPM | TEMPERATURE: 98.5 F | DIASTOLIC BLOOD PRESSURE: 79 MMHG | SYSTOLIC BLOOD PRESSURE: 120 MMHG | WEIGHT: 293 LBS

## 2021-10-27 DIAGNOSIS — E78.2 MIXED HYPERLIPIDEMIA: ICD-10-CM

## 2021-10-27 DIAGNOSIS — R07.9 CHEST PAIN IN ADULT: ICD-10-CM

## 2021-10-27 DIAGNOSIS — R94.39 ABNORMAL NUCLEAR STRESS TEST: ICD-10-CM

## 2021-10-27 DIAGNOSIS — Z91.89 MULTIPLE RISK FACTORS FOR CORONARY ARTERY DISEASE: ICD-10-CM

## 2021-10-27 DIAGNOSIS — I10 ESSENTIAL HYPERTENSION: ICD-10-CM

## 2021-10-27 LAB
ALBUMIN SERPL-MCNC: 4.22 G/DL (ref 3.5–5.2)
ALBUMIN/GLOB SERPL: 1.2 G/DL
ALP SERPL-CCNC: 89 U/L (ref 39–117)
ALT SERPL W P-5'-P-CCNC: 10 U/L (ref 1–33)
ANION GAP SERPL CALCULATED.3IONS-SCNC: 12.3 MMOL/L (ref 5–15)
AST SERPL-CCNC: 15 U/L (ref 1–32)
BASOPHILS # BLD AUTO: 0.04 10*3/MM3 (ref 0–0.2)
BASOPHILS NFR BLD AUTO: 0.3 % (ref 0–1.5)
BILIRUB SERPL-MCNC: 0.5 MG/DL (ref 0–1.2)
BUN SERPL-MCNC: 8 MG/DL (ref 6–20)
BUN/CREAT SERPL: 7.1 (ref 7–25)
CALCIUM SPEC-SCNC: 9.1 MG/DL (ref 8.6–10.5)
CHLORIDE SERPL-SCNC: 102 MMOL/L (ref 98–107)
CO2 SERPL-SCNC: 26.7 MMOL/L (ref 22–29)
CREAT SERPL-MCNC: 1.13 MG/DL (ref 0.57–1)
DEPRECATED RDW RBC AUTO: 53.4 FL (ref 37–54)
EOSINOPHIL # BLD AUTO: 0.31 10*3/MM3 (ref 0–0.4)
EOSINOPHIL NFR BLD AUTO: 2.5 % (ref 0.3–6.2)
ERYTHROCYTE [DISTWIDTH] IN BLOOD BY AUTOMATED COUNT: 14.5 % (ref 12.3–15.4)
GFR SERPL CREATININE-BSD FRML MDRD: 51 ML/MIN/1.73
GLOBULIN UR ELPH-MCNC: 3.4 GM/DL
GLUCOSE SERPL-MCNC: 106 MG/DL (ref 65–99)
HCT VFR BLD AUTO: 43.7 % (ref 34–46.6)
HGB BLD-MCNC: 13.6 G/DL (ref 12–15.9)
IMM GRANULOCYTES # BLD AUTO: 0.04 10*3/MM3 (ref 0–0.05)
IMM GRANULOCYTES NFR BLD AUTO: 0.3 % (ref 0–0.5)
LYMPHOCYTES # BLD AUTO: 3.03 10*3/MM3 (ref 0.7–3.1)
LYMPHOCYTES NFR BLD AUTO: 24.1 % (ref 19.6–45.3)
MCH RBC QN AUTO: 30.8 PG (ref 26.6–33)
MCHC RBC AUTO-ENTMCNC: 31.1 G/DL (ref 31.5–35.7)
MCV RBC AUTO: 98.9 FL (ref 79–97)
MONOCYTES # BLD AUTO: 0.72 10*3/MM3 (ref 0.1–0.9)
MONOCYTES NFR BLD AUTO: 5.7 % (ref 5–12)
NEUTROPHILS NFR BLD AUTO: 67.1 % (ref 42.7–76)
NEUTROPHILS NFR BLD AUTO: 8.42 10*3/MM3 (ref 1.7–7)
NRBC BLD AUTO-RTO: 0 /100 WBC (ref 0–0.2)
PLATELET # BLD AUTO: 269 10*3/MM3 (ref 140–450)
PMV BLD AUTO: 9 FL (ref 6–12)
POTASSIUM SERPL-SCNC: 3.8 MMOL/L (ref 3.5–5.2)
PROT SERPL-MCNC: 7.6 G/DL (ref 6–8.5)
RBC # BLD AUTO: 4.42 10*6/MM3 (ref 3.77–5.28)
SODIUM SERPL-SCNC: 141 MMOL/L (ref 136–145)
WBC # BLD AUTO: 12.56 10*3/MM3 (ref 3.4–10.8)

## 2021-10-27 PROCEDURE — 93454 CORONARY ARTERY ANGIO S&I: CPT | Performed by: INTERNAL MEDICINE

## 2021-10-27 PROCEDURE — 85025 COMPLETE CBC W/AUTO DIFF WBC: CPT | Performed by: INTERNAL MEDICINE

## 2021-10-27 PROCEDURE — 25010000002 MIDAZOLAM PER 1 MG: Performed by: INTERNAL MEDICINE

## 2021-10-27 PROCEDURE — 25010000002 HEPARIN (PORCINE) PER 1000 UNITS: Performed by: INTERNAL MEDICINE

## 2021-10-27 PROCEDURE — 25010000002 FENTANYL CITRATE (PF) 50 MCG/ML SOLUTION: Performed by: INTERNAL MEDICINE

## 2021-10-27 PROCEDURE — 0 IOPAMIDOL PER 1 ML: Performed by: INTERNAL MEDICINE

## 2021-10-27 PROCEDURE — 99235 HOSP IP/OBS SAME DATE MOD 70: CPT | Performed by: INTERNAL MEDICINE

## 2021-10-27 PROCEDURE — C1769 GUIDE WIRE: HCPCS | Performed by: INTERNAL MEDICINE

## 2021-10-27 PROCEDURE — C1894 INTRO/SHEATH, NON-LASER: HCPCS | Performed by: INTERNAL MEDICINE

## 2021-10-27 PROCEDURE — 80053 COMPREHEN METABOLIC PANEL: CPT | Performed by: INTERNAL MEDICINE

## 2021-10-27 RX ORDER — AMLODIPINE BESYLATE 10 MG/1
10 TABLET ORAL DAILY
Status: CANCELLED | OUTPATIENT
Start: 2021-10-27

## 2021-10-27 RX ORDER — MIDAZOLAM HYDROCHLORIDE 1 MG/ML
INJECTION INTRAMUSCULAR; INTRAVENOUS AS NEEDED
Status: DISCONTINUED | OUTPATIENT
Start: 2021-10-27 | End: 2021-10-27 | Stop reason: HOSPADM

## 2021-10-27 RX ORDER — METOPROLOL SUCCINATE 25 MG/1
25 TABLET, EXTENDED RELEASE ORAL DAILY
Status: CANCELLED | OUTPATIENT
Start: 2021-10-27

## 2021-10-27 RX ORDER — FENTANYL CITRATE 50 UG/ML
INJECTION, SOLUTION INTRAMUSCULAR; INTRAVENOUS AS NEEDED
Status: DISCONTINUED | OUTPATIENT
Start: 2021-10-27 | End: 2021-10-27 | Stop reason: HOSPADM

## 2021-10-27 RX ORDER — SODIUM CHLORIDE 9 MG/ML
100 INJECTION, SOLUTION INTRAVENOUS ONCE
Status: CANCELLED | OUTPATIENT
Start: 2021-10-27 | End: 2021-10-27

## 2021-10-27 RX ORDER — LISINOPRIL 10 MG/1
40 TABLET ORAL DAILY
Status: CANCELLED | OUTPATIENT
Start: 2021-10-27

## 2021-10-27 RX ORDER — ACETAMINOPHEN 325 MG/1
650 TABLET ORAL EVERY 4 HOURS PRN
Status: CANCELLED | OUTPATIENT
Start: 2021-10-27

## 2021-10-27 RX ORDER — ASPIRIN 81 MG/1
81 TABLET ORAL DAILY
Status: CANCELLED | OUTPATIENT
Start: 2021-10-27

## 2021-10-27 RX ORDER — LIDOCAINE HYDROCHLORIDE 20 MG/ML
INJECTION, SOLUTION INFILTRATION; PERINEURAL AS NEEDED
Status: DISCONTINUED | OUTPATIENT
Start: 2021-10-27 | End: 2021-10-27 | Stop reason: HOSPADM

## 2021-10-27 RX ORDER — ATORVASTATIN CALCIUM 40 MG/1
80 TABLET, FILM COATED ORAL NIGHTLY
Status: CANCELLED | OUTPATIENT
Start: 2021-10-27

## 2021-10-27 RX ORDER — SODIUM CHLORIDE 9 MG/ML
INJECTION, SOLUTION INTRAVENOUS CONTINUOUS PRN
Status: COMPLETED | OUTPATIENT
Start: 2021-10-27 | End: 2021-10-27

## 2022-02-09 ENCOUNTER — OFFICE VISIT (OUTPATIENT)
Dept: CARDIOLOGY | Facility: CLINIC | Age: 52
End: 2022-02-09

## 2022-02-09 VITALS
TEMPERATURE: 97.1 F | BODY MASS INDEX: 51.91 KG/M2 | OXYGEN SATURATION: 96 % | WEIGHT: 293 LBS | DIASTOLIC BLOOD PRESSURE: 78 MMHG | HEART RATE: 74 BPM | HEIGHT: 63 IN | SYSTOLIC BLOOD PRESSURE: 130 MMHG

## 2022-02-09 DIAGNOSIS — E78.2 MIXED HYPERLIPIDEMIA: ICD-10-CM

## 2022-02-09 DIAGNOSIS — E66.01 MORBID OBESITY WITH BMI OF 50.0-59.9, ADULT: Primary | ICD-10-CM

## 2022-02-09 DIAGNOSIS — I49.3 FREQUENT UNIFOCAL PVCS: ICD-10-CM

## 2022-02-09 DIAGNOSIS — R00.2 PALPITATIONS: ICD-10-CM

## 2022-02-09 DIAGNOSIS — Z91.89 MULTIPLE RISK FACTORS FOR CORONARY ARTERY DISEASE: ICD-10-CM

## 2022-02-09 DIAGNOSIS — Z72.0 TOBACCO USE: ICD-10-CM

## 2022-02-09 DIAGNOSIS — I25.10 CORONARY ARTERY DISEASE INVOLVING NATIVE CORONARY ARTERY OF NATIVE HEART WITHOUT ANGINA PECTORIS: ICD-10-CM

## 2022-02-09 DIAGNOSIS — I10 ESSENTIAL HYPERTENSION: ICD-10-CM

## 2022-02-09 PROCEDURE — 99214 OFFICE O/P EST MOD 30 MIN: CPT | Performed by: INTERNAL MEDICINE

## 2022-02-09 RX ORDER — ASPIRIN 81 MG/1
1 TABLET ORAL DAILY
COMMUNITY
Start: 2021-11-30

## 2022-02-09 RX ORDER — METOPROLOL SUCCINATE 25 MG/1
50 TABLET, EXTENDED RELEASE ORAL DAILY
Qty: 180 TABLET | Refills: 1 | Status: SHIPPED | OUTPATIENT
Start: 2022-02-09

## 2022-02-09 NOTE — PROGRESS NOTES
subjective     Chief Complaint   Patient presents with   • Follow-up     s/p heart cath on 10/27/21   • Hypertension     follow up     History of Present Illness    Patient is 51 years old white female who is here for cardiology follow-up.  Patient has known coronary artery disease with 60% LAD lesion on medical management.  She denies any chest pain palpitations or shortness of breath.  Unfortunately she continues to smoke and also has not lost any weight.  She is taking statin therapy with Lipitor 20 mg daily, beta-blocker therapy with Toprol and antiplatelet therapy with baby aspirin.    Blood pressure is controlled with Toprol, Norvasc, hydrochlorothiazide and lisinopril.    Hyperlipidemia on Lipitor but she has not had lab work in quite some time.    Past Surgical History:   Procedure Laterality Date   • ANKLE SURGERY Right     Broken   • CARDIAC CATHETERIZATION N/A 10/27/2021    Procedure: Left Heart Cath;  Surgeon: Colt Smith MD;  Location: [facility] INVASIVE LOCATION;  Service: Cardiology;  Laterality: N/A;   •  SECTION     •  SECTION     • HYSTERECTOMY      Partial   • TUBAL ABDOMINAL LIGATION       Family History   Problem Relation Age of Onset   • Heart disease Mother    • Obesity Mother    • Hypertension Mother    • Heart attack Mother    • Melanoma Mother    • No Known Problems Father    • Obesity Sister    • Obesity Maternal Grandmother    • Hypertension Maternal Grandmother    • Heart attack Maternal Grandmother    • No Known Problems Maternal Grandfather    • No Known Problems Paternal Grandmother    • No Known Problems Paternal Grandfather      Past Medical History:   Diagnosis Date   • Cancer (HCC)    • Coronary artery disease 60 % LAD 2022   • Depression    • Hyperlipidemia    • Hypertension      Patient Active Problem List   Diagnosis   • Chest pain in adult   • Essential hypertension   • Mixed hyperlipidemia   • Generalized anxiety disorder   • Obesity   •  Tobacco use   • Family history of premature coronary artery disease   • Multiple risk factors for coronary artery disease   • Palpitations   • Abnormal stress test   • Frequent unifocal PVCs, ventricular triplets   • Morbid obesity with BMI of 50.0-59.9, adult (HCC)   • Coronary artery disease 60 % LAD       Social History     Tobacco Use   • Smoking status: Current Every Day Smoker     Packs/day: 1.00     Years: 15.00     Pack years: 15.00     Types: Cigarettes   • Smokeless tobacco: Never Used   Substance Use Topics   • Alcohol use: No   • Drug use: Yes     Types: Benzodiazepines       No Known Allergies    Current Outpatient Medications on File Prior to Visit   Medication Sig   • amLODIPine (NORVASC) 10 MG tablet Take 10 mg by mouth Daily.   • Aspirin Adult Low Strength 81 MG EC tablet Take 1 tablet by mouth Daily.   • atorvastatin (LIPITOR) 20 MG tablet Take 20 mg by mouth Daily.   • buprenorphine-naloxone (SUBOXONE) 8-2 MG per SL tablet Place 1 tablet under the tongue Daily.   • buPROPion XL (WELLBUTRIN XL) 150 MG 24 hr tablet Take 150 mg by mouth Daily.   • busPIRone (BUSPAR) 10 MG tablet Take 10 mg by mouth 3 (Three) Times a Day.   • cetirizine (zyrTEC) 10 MG tablet Take 10 mg by mouth Daily.   • citalopram (CeleXA) 20 MG tablet Take 20 mg by mouth Daily.   • escitalopram (LEXAPRO) 20 MG tablet Take 20 mg by mouth Daily.   • lisinopril (PRINIVIL,ZESTRIL) 40 MG tablet Take 40 mg by mouth Daily.   • Naloxegol Oxalate (MOVANTIK) 25 MG tablet Take 25 mg by mouth Every Morning.   • omeprazole (priLOSEC) 20 MG capsule Take 20 mg by mouth Daily.   • QUEtiapine (SEROquel) 100 MG tablet Take 100 mg by mouth Every Night.   • vitamin D (ERGOCALCIFEROL) 77811 units capsule capsule Take 50,000 Units by mouth 1 (One) Time Per Week.   • [DISCONTINUED] hydrochlorothiazide (MICROZIDE) 12.5 MG capsule Take 1 capsule by mouth Daily.   • [DISCONTINUED] metoprolol succinate XL (TOPROL-XL) 25 MG 24 hr tablet Take 1 tablet by mouth  "Daily.     No current facility-administered medications on file prior to visit.         The following portions of the patient's history were reviewed and updated as appropriate: allergies, current medications, past family history, past medical history, past social history, past surgical history and problem list.    Review of Systems   Constitutional: Negative.   HENT: Negative.  Negative for congestion.    Eyes: Negative.    Cardiovascular: Negative.  Negative for chest pain, cyanosis, dyspnea on exertion, irregular heartbeat, leg swelling, near-syncope, orthopnea, palpitations, paroxysmal nocturnal dyspnea and syncope.   Respiratory: Negative.  Negative for shortness of breath.    Hematologic/Lymphatic: Negative.    Musculoskeletal: Negative.    Gastrointestinal: Negative.    Neurological: Negative.  Negative for headaches.          Objective:     /78 (BP Location: Left arm, Patient Position: Sitting, Cuff Size: Adult)   Pulse 74   Temp 97.1 °F (36.2 °C)   Ht 160 cm (63\")   Wt (!) 144 kg (317 lb)   SpO2 96%   BMI 56.15 kg/m²   Pulmonary:      Effort: Pulmonary effort is normal.      Breath sounds: Normal breath sounds. No stridor. No wheezing. No rhonchi. No rales.   Cardiovascular:      PMI at left midclavicular line. Normal rate. Regular rhythm. Normal S1. Normal S2.      Murmurs: There is no murmur.      No gallop. No click. No rub.   Pulses:     Intact distal pulses.   Edema:     Peripheral edema absent.           Lab Review  Lab Results   Component Value Date     10/27/2021    K 3.8 10/27/2021     10/27/2021    BUN 8 10/27/2021    CREATININE 1.13 (H) 10/27/2021    GLUCOSE 106 (H) 10/27/2021    CALCIUM 9.1 10/27/2021    ALT 10 10/27/2021    ALKPHOS 89 10/27/2021     No results found for: CKTOTAL  Lab Results   Component Value Date    WBC 12.56 (H) 10/27/2021    HGB 13.6 10/27/2021    HCT 43.7 10/27/2021     10/27/2021       Procedures       I personally viewed and interpreted the " patient's LAB data         Assessment:     1. Morbid obesity with BMI of 50.0-59.9, adult (HCC)    2. Multiple risk factors for coronary artery disease    3. Tobacco use    4. Essential hypertension    5. Mixed hyperlipidemia    6. Palpitations    7. Frequent unifocal PVCs, ventricular triplets    8. Coronary artery disease involving native coronary artery of native heart without angina pectoris          Plan:     Patient is 51 years old white female with known coronary artery disease.  She has 60% LAD lesion 50% diagonal.  She is asymptomatic aggressive risk factor modification emphasized.    Patient is obese, weight loss, healthy lifestyle exercise program and diet was stressed.    Patient continues to smoke cessation of tobacco use stressed.    Toprol dose was increased to 50 mg daily.  She was advised to discontinue hydrochlorothiazide.  Patient will also continue antiplatelet therapy and beta-blocker therapy with Lipitor.  Lab work scheduled.  Follow-up in 3 months        No follow-ups on file.

## 2023-03-09 ENCOUNTER — HOSPITAL ENCOUNTER (EMERGENCY)
Facility: HOSPITAL | Age: 53
Discharge: HOME | End: 2023-03-09
Attending: EMERGENCY MEDICINE | Admitting: EMERGENCY MEDICINE
Payer: COMMERCIAL

## 2023-03-09 ENCOUNTER — APPOINTMENT (OUTPATIENT)
Dept: GENERAL RADIOLOGY | Facility: HOSPITAL | Age: 53
End: 2023-03-09
Payer: COMMERCIAL

## 2023-03-09 ENCOUNTER — APPOINTMENT (OUTPATIENT)
Dept: ULTRASOUND IMAGING | Facility: HOSPITAL | Age: 53
End: 2023-03-09
Payer: COMMERCIAL

## 2023-03-09 VITALS
SYSTOLIC BLOOD PRESSURE: 118 MMHG | BODY MASS INDEX: 50.02 KG/M2 | WEIGHT: 293 LBS | HEIGHT: 64 IN | TEMPERATURE: 98 F | OXYGEN SATURATION: 92 % | DIASTOLIC BLOOD PRESSURE: 70 MMHG | RESPIRATION RATE: 18 BRPM | HEART RATE: 96 BPM

## 2023-03-09 DIAGNOSIS — R07.9 CHEST PAIN, UNSPECIFIED TYPE: Primary | ICD-10-CM

## 2023-03-09 DIAGNOSIS — L03.115 CELLULITIS OF RIGHT LEG: ICD-10-CM

## 2023-03-09 DIAGNOSIS — N17.9 AKI (ACUTE KIDNEY INJURY): ICD-10-CM

## 2023-03-09 LAB
ALBUMIN SERPL-MCNC: 3.1 G/DL (ref 3.5–5.2)
ALBUMIN/GLOB SERPL: 0.9 G/DL
ALP SERPL-CCNC: 86 U/L (ref 39–117)
ALT SERPL W P-5'-P-CCNC: 16 U/L (ref 1–33)
ANION GAP SERPL CALCULATED.3IONS-SCNC: 13.7 MMOL/L (ref 5–15)
AST SERPL-CCNC: 13 U/L (ref 1–32)
BASOPHILS # BLD AUTO: 0.02 10*3/MM3 (ref 0–0.2)
BASOPHILS NFR BLD AUTO: 0.2 % (ref 0–1.5)
BILIRUB SERPL-MCNC: 0.3 MG/DL (ref 0–1.2)
BUN SERPL-MCNC: 19 MG/DL (ref 6–20)
BUN/CREAT SERPL: 7.7 (ref 7–25)
CALCIUM SPEC-SCNC: 8.5 MG/DL (ref 8.6–10.5)
CHLORIDE SERPL-SCNC: 96 MMOL/L (ref 98–107)
CO2 SERPL-SCNC: 22.3 MMOL/L (ref 22–29)
CREAT SERPL-MCNC: 2.47 MG/DL (ref 0.57–1)
D DIMER PPP FEU-MCNC: 0.76 MCGFEU/ML (ref 0–0.53)
DEPRECATED RDW RBC AUTO: 52.4 FL (ref 37–54)
EGFRCR SERPLBLD CKD-EPI 2021: 22.8 ML/MIN/1.73
EOSINOPHIL # BLD AUTO: 0.11 10*3/MM3 (ref 0–0.4)
EOSINOPHIL NFR BLD AUTO: 1 % (ref 0.3–6.2)
ERYTHROCYTE [DISTWIDTH] IN BLOOD BY AUTOMATED COUNT: 14.1 % (ref 12.3–15.4)
GEN 5 2HR TROPONIN T REFLEX: 16 NG/L
GLOBULIN UR ELPH-MCNC: 3.4 GM/DL
GLUCOSE SERPL-MCNC: 98 MG/DL (ref 65–99)
HCT VFR BLD AUTO: 35.7 % (ref 34–46.6)
HGB BLD-MCNC: 12 G/DL (ref 12–15.9)
HOLD SPECIMEN: NORMAL
HOLD SPECIMEN: NORMAL
IMM GRANULOCYTES # BLD AUTO: 0.04 10*3/MM3 (ref 0–0.05)
IMM GRANULOCYTES NFR BLD AUTO: 0.4 % (ref 0–0.5)
LYMPHOCYTES # BLD AUTO: 2.1 10*3/MM3 (ref 0.7–3.1)
LYMPHOCYTES NFR BLD AUTO: 20 % (ref 19.6–45.3)
MCH RBC QN AUTO: 34.4 PG (ref 26.6–33)
MCHC RBC AUTO-ENTMCNC: 33.6 G/DL (ref 31.5–35.7)
MCV RBC AUTO: 102.3 FL (ref 79–97)
MONOCYTES # BLD AUTO: 0.6 10*3/MM3 (ref 0.1–0.9)
MONOCYTES NFR BLD AUTO: 5.7 % (ref 5–12)
NEUTROPHILS NFR BLD AUTO: 7.64 10*3/MM3 (ref 1.7–7)
NEUTROPHILS NFR BLD AUTO: 72.7 % (ref 42.7–76)
NRBC BLD AUTO-RTO: 0 /100 WBC (ref 0–0.2)
PLATELET # BLD AUTO: 226 10*3/MM3 (ref 140–450)
PMV BLD AUTO: 8.3 FL (ref 6–12)
POTASSIUM SERPL-SCNC: 3.5 MMOL/L (ref 3.5–5.2)
PROT SERPL-MCNC: 6.5 G/DL (ref 6–8.5)
RBC # BLD AUTO: 3.49 10*6/MM3 (ref 3.77–5.28)
SODIUM SERPL-SCNC: 132 MMOL/L (ref 136–145)
TROPONIN T DELTA: -1 NG/L
TROPONIN T SERPL HS-MCNC: 17 NG/L
WBC NRBC COR # BLD: 10.51 10*3/MM3 (ref 3.4–10.8)
WHOLE BLOOD HOLD COAG: NORMAL
WHOLE BLOOD HOLD SPECIMEN: NORMAL

## 2023-03-09 PROCEDURE — 84484 ASSAY OF TROPONIN QUANT: CPT | Performed by: EMERGENCY MEDICINE

## 2023-03-09 PROCEDURE — 93005 ELECTROCARDIOGRAM TRACING: CPT | Performed by: EMERGENCY MEDICINE

## 2023-03-09 PROCEDURE — 93971 EXTREMITY STUDY: CPT

## 2023-03-09 PROCEDURE — 80053 COMPREHEN METABOLIC PANEL: CPT | Performed by: EMERGENCY MEDICINE

## 2023-03-09 PROCEDURE — 71045 X-RAY EXAM CHEST 1 VIEW: CPT | Performed by: RADIOLOGY

## 2023-03-09 PROCEDURE — 36415 COLL VENOUS BLD VENIPUNCTURE: CPT

## 2023-03-09 PROCEDURE — 99283 EMERGENCY DEPT VISIT LOW MDM: CPT

## 2023-03-09 PROCEDURE — 85025 COMPLETE CBC W/AUTO DIFF WBC: CPT | Performed by: EMERGENCY MEDICINE

## 2023-03-09 PROCEDURE — 85379 FIBRIN DEGRADATION QUANT: CPT | Performed by: PHYSICIAN ASSISTANT

## 2023-03-09 PROCEDURE — 71045 X-RAY EXAM CHEST 1 VIEW: CPT

## 2023-03-09 PROCEDURE — 93010 ELECTROCARDIOGRAM REPORT: CPT | Performed by: INTERNAL MEDICINE

## 2023-03-09 RX ORDER — ASPIRIN 325 MG
325 TABLET ORAL ONCE
Status: COMPLETED | OUTPATIENT
Start: 2023-03-09 | End: 2023-03-09

## 2023-03-09 RX ORDER — CEPHALEXIN 500 MG/1
500 CAPSULE ORAL 3 TIMES DAILY
Qty: 30 CAPSULE | Refills: 0 | Status: SHIPPED | OUTPATIENT
Start: 2023-03-09 | End: 2023-03-19

## 2023-03-09 RX ORDER — SODIUM CHLORIDE 0.9 % (FLUSH) 0.9 %
10 SYRINGE (ML) INJECTION AS NEEDED
Status: DISCONTINUED | OUTPATIENT
Start: 2023-03-09 | End: 2023-03-10 | Stop reason: HOSPADM

## 2023-03-09 RX ADMIN — ASPIRIN 325 MG: 325 TABLET ORAL at 16:52

## 2023-03-09 RX ADMIN — SODIUM CHLORIDE 500 ML: 9 INJECTION, SOLUTION INTRAVENOUS at 20:54

## 2023-03-09 NOTE — ED PROVIDER NOTES
Subjective   History of Present Illness  This is a 52-year-old female that presents to the emergency department with complaint of left-sided chest pain.  Patient also complains of right lower extremity swelling and edema with oozing    History provided by:  Patient   used: No    Chest Pain  Pain location:  L chest  Pain quality: aching, stabbing and tightness    Pain quality: not tearing    Pain radiates to:  Does not radiate  Pain severity:  Moderate  Onset quality:  Gradual  Duration:  2 days  Timing:  Intermittent  Progression:  Worsening  Chronicity:  New  Context: not breathing, not eating, not intercourse, not lifting and not raising an arm    Relieved by:  Nothing  Worsened by:  Nothing  Ineffective treatments:  None tried  Associated symptoms: no altered mental status, no anorexia, no anxiety, no back pain, no claudication, no cough, no diaphoresis, no fatigue, no headache, no heartburn, no lower extremity edema, no nausea, no palpitations, no PND, no shortness of breath and no vomiting        Review of Systems   Constitutional: Negative.  Negative for diaphoresis and fatigue.   HENT: Negative.  Negative for dental problem, drooling and ear discharge.    Eyes: Negative.  Negative for photophobia, pain and redness.   Respiratory: Positive for chest tightness. Negative for cough and shortness of breath.    Cardiovascular: Positive for chest pain. Negative for palpitations, claudication and PND.   Gastrointestinal: Negative for anorexia, heartburn, nausea and vomiting.   Endocrine: Negative.  Negative for heat intolerance, polydipsia and polyphagia.   Genitourinary: Negative.  Negative for dyspareunia, dysuria, flank pain, frequency and genital sores.   Musculoskeletal: Negative.  Negative for back pain.   Skin: Negative.  Negative for color change and pallor.   Neurological: Negative for headaches.   All other systems reviewed and are negative.      Past Medical History:   Diagnosis Date    • Cancer (HCC)    • Coronary artery disease 60 % LAD 2022   • Depression    • Hyperlipidemia    • Hypertension        No Known Allergies    Past Surgical History:   Procedure Laterality Date   • ANKLE SURGERY Right     Broken   • CARDIAC CATHETERIZATION N/A 10/27/2021    Procedure: Left Heart Cath;  Surgeon: Colt Smith MD;  Location: [facility] CATH INVASIVE LOCATION;  Service: Cardiology;  Laterality: N/A;   •  SECTION     •  SECTION     • HYSTERECTOMY      Partial   • TUBAL ABDOMINAL LIGATION         Family History   Problem Relation Age of Onset   • Heart disease Mother    • Obesity Mother    • Hypertension Mother    • Heart attack Mother    • Melanoma Mother    • No Known Problems Father    • Obesity Sister    • Obesity Maternal Grandmother    • Hypertension Maternal Grandmother    • Heart attack Maternal Grandmother    • No Known Problems Maternal Grandfather    • No Known Problems Paternal Grandmother    • No Known Problems Paternal Grandfather        Social History     Socioeconomic History   • Marital status:    Tobacco Use   • Smoking status: Every Day     Packs/day: 1.00     Years: 15.00     Pack years: 15.00     Types: Cigarettes   • Smokeless tobacco: Never   Substance and Sexual Activity   • Alcohol use: No   • Drug use: Yes     Types: Benzodiazepines   • Sexual activity: Defer           Objective   Physical Exam  Vitals and nursing note reviewed.   Constitutional:       General: She is not in acute distress.     Appearance: She is well-developed and normal weight. She is not ill-appearing or toxic-appearing.   HENT:      Head: Normocephalic and atraumatic.   Eyes:      Extraocular Movements: Extraocular movements intact.      Pupils: Pupils are equal, round, and reactive to light.   Neck:      Thyroid: No thyromegaly.      Vascular: No hepatojugular reflux.      Trachea: No tracheal deviation.   Cardiovascular:      Rate and Rhythm: Normal rate and regular  rhythm.      Pulses:           Carotid pulses are 2+ on the right side and 2+ on the left side.       Radial pulses are 2+ on the right side and 2+ on the left side.        Dorsalis pedis pulses are 2+ on the right side and 2+ on the left side.        Posterior tibial pulses are 2+ on the right side and 2+ on the left side.      Heart sounds: Normal heart sounds. Heart sounds not distant. No murmur heard.    No friction rub. No gallop. No S3 sounds.   Pulmonary:      Effort: Pulmonary effort is normal. No tachypnea, accessory muscle usage or respiratory distress.      Breath sounds: Normal breath sounds. No decreased breath sounds, wheezing, rhonchi or rales.   Abdominal:      General: Bowel sounds are normal. There is no abdominal bruit.      Palpations: Abdomen is soft. There is no fluid wave, hepatomegaly, splenomegaly or mass.      Tenderness: There is no abdominal tenderness. There is no guarding.   Musculoskeletal:         General: Normal range of motion.      Cervical back: Normal range of motion and neck supple.      Right lower leg: No tenderness. No edema.      Left lower leg: No tenderness. No edema.   Lymphadenopathy:      Cervical: No cervical adenopathy.   Skin:     General: Skin is warm.      Capillary Refill: Capillary refill takes less than 2 seconds.      Coloration: Skin is not cyanotic or pale.      Findings: No ecchymosis, erythema or rash.   Neurological:      General: No focal deficit present.      Mental Status: She is alert and oriented to person, place, and time.      Cranial Nerves: No cranial nerve deficit.      Motor: No weakness.   Psychiatric:         Mood and Affect: Mood normal. Mood is not anxious.         Behavior: Behavior normal. Behavior is not agitated.         Procedures           ED Course  ED Course as of 03/09/23 1951   Thu Mar 09, 2023   1631 ECG 12 Lead ED Triage Standing Order; Chest Pain  Normal sinus rhythm.  Rate 98.  Normal axis.  Normal QT interval.  Diffuse low  voltage.  Q wave in lead V1.  No acute ST elevation or depression.  Abnormal EKG.  Interpreted by me.  Electronically signed by Crescencio Lindsey MD, 03/09/23, 4:32 PM EST.   [BC]   1926 D-dimer, Quantitative(!) []   1948 Endorsed to Dr. Seth.  []      ED Course User Index  [BC] Crescencio Lindsey MD  [] Chinedu Morin PA-C                                           [facility]    Final diagnoses:   Acute renal failure superimposed on chronic kidney disease, on chronic dialysis, unspecified acute renal failure type (HCC)   Chest pain, unspecified type   Cellulitis of right leg       ED Disposition  ED Disposition     ED Disposition   Discharge    Condition   Stable    Comment   --             Kamla Hernandez April, APRN  [address] [facility] [address] [phone]    Call in 1 day           Medication List      New Prescriptions    cephalexin 500 MG capsule  Commonly known as: KEFLEX  Take 1 capsule by mouth 3 (Three) Times a Day for 10 days.           Where to Get Your Medications      These medications were sent to [facility], [facility] [place], [place] - [facility] [address] - [phone]  - [phone] Health system [address], [facility] [address]    Phone: [phone]   · cephalexin 500 MG capsule          Chinedu Morin PA-C  03/09/23 1951

## 2023-03-10 LAB
QT INTERVAL: 352 MS
QTC INTERVAL: 449 MS

## 2023-04-11 ENCOUNTER — OFFICE VISIT (OUTPATIENT)
Dept: CARDIOLOGY | Facility: CLINIC | Age: 53
End: 2023-04-11
Payer: COMMERCIAL

## 2023-04-11 VITALS
HEIGHT: 64 IN | WEIGHT: 293 LBS | SYSTOLIC BLOOD PRESSURE: 119 MMHG | OXYGEN SATURATION: 98 % | HEART RATE: 77 BPM | DIASTOLIC BLOOD PRESSURE: 81 MMHG | BODY MASS INDEX: 50.02 KG/M2

## 2023-04-11 DIAGNOSIS — N18.32 STAGE 3B CHRONIC KIDNEY DISEASE: ICD-10-CM

## 2023-04-11 DIAGNOSIS — Z91.89 MULTIPLE RISK FACTORS FOR CORONARY ARTERY DISEASE: ICD-10-CM

## 2023-04-11 DIAGNOSIS — R60.0 BILATERAL LOWER EXTREMITY EDEMA: Primary | ICD-10-CM

## 2023-04-11 DIAGNOSIS — E66.01 MORBID OBESITY WITH BMI OF 50.0-59.9, ADULT: ICD-10-CM

## 2023-04-11 DIAGNOSIS — E78.2 MIXED HYPERLIPIDEMIA: ICD-10-CM

## 2023-04-11 DIAGNOSIS — I25.10 CORONARY ARTERY DISEASE INVOLVING NATIVE CORONARY ARTERY OF NATIVE HEART WITHOUT ANGINA PECTORIS: ICD-10-CM

## 2023-04-11 PROBLEM — N18.30 ACUTE RENAL FAILURE SUPERIMPOSED ON STAGE 3 CHRONIC KIDNEY DISEASE: Status: ACTIVE | Noted: 2023-04-11

## 2023-04-11 PROBLEM — N17.9 ACUTE RENAL FAILURE SUPERIMPOSED ON STAGE 3 CHRONIC KIDNEY DISEASE: Status: ACTIVE | Noted: 2023-04-11

## 2023-04-11 PROCEDURE — 99214 OFFICE O/P EST MOD 30 MIN: CPT | Performed by: INTERNAL MEDICINE

## 2023-04-11 PROCEDURE — 3074F SYST BP LT 130 MM HG: CPT | Performed by: INTERNAL MEDICINE

## 2023-04-11 PROCEDURE — 3079F DIAST BP 80-89 MM HG: CPT | Performed by: INTERNAL MEDICINE

## 2023-04-11 NOTE — LETTER
2023     ADDIS Villanueva  [address] [facility] [address]    Patient: Winnie Pittman   YOB: 1970   Date of Visit: 2023       Dear ADDIS Villanueva    Winnie Pittman was in my office today. Below is a copy of my note.    If you have questions, please do not hesitate to call me. I look forward to following Winnie along with you.         Sincerely,        Alysha Arboleda MD        CC: No Recipients    subjective     Chief Complaint   Patient presents with   • Shortness of Breath     Pt states she is having trouble breathing walking.    • Leg Swelling     Right leg swollen   • Hand Pain     Pt states her right hand is tight, swollen and pain.    • Obesity     Follow up      History of Present Illness    Winnie is 52 years old white female who complains of marked bilateral lower extremity edema up to mid thigh levels and has cellulitis of right lower extremity.    She has history of hypertension and has been taking Norvasc 10 mg daily which is probably contributing to marked edema.    She is also morbidly overweight and has complains of swelling of both hands.    Denies any chest pain or palpitations but complains of being short of breath  She has nonobstructive coronary artery disease 60% LAD lesion 2021    Past Surgical History:   Procedure Laterality Date   • ANKLE SURGERY Right     Broken   • CARDIAC CATHETERIZATION N/A 10/27/2021    Procedure: Left Heart Cath;  Surgeon: Colt Smith MD;  Location: [facility] INVASIVE LOCATION;  Service: Cardiology;  Laterality: N/A;   •  SECTION     •  SECTION     • HYSTERECTOMY      Partial   • TUBAL ABDOMINAL LIGATION       Family History   Problem Relation Age of Onset   • Heart disease Mother    • Obesity Mother    • Hypertension Mother    • Heart attack Mother    • Melanoma Mother    • No Known Problems Father    • Obesity Sister    • Obesity  Maternal Grandmother    • Hypertension Maternal Grandmother    • Heart attack Maternal Grandmother    • No Known Problems Maternal Grandfather    • No Known Problems Paternal Grandmother    • No Known Problems Paternal Grandfather      Past Medical History:   Diagnosis Date   • Acute renal failure superimposed on stage 3 chronic kidney disease 4/11/2023   • Cancer    • Coronary artery disease 60 % LAD 2/9/2022   • Depression    • Hyperlipidemia    • Hypertension      Patient Active Problem List   Diagnosis   • Chest pain in adult   • Essential hypertension   • Mixed hyperlipidemia   • Generalized anxiety disorder   • Obesity   • Tobacco use   • Family history of premature coronary artery disease   • Multiple risk factors for coronary artery disease   • Palpitations   • Abnormal stress test   • Frequent unifocal PVCs, ventricular triplets   • Morbid obesity with BMI of 50.0-59.9, adult   • Coronary artery disease 60 % LAD, October 2021.   • Stage 3b chronic kidney disease   • Bilateral lower extremity edema       Social History     Tobacco Use   • Smoking status: Every Day     Packs/day: 1.00     Years: 15.00     Pack years: 15.00     Types: Cigarettes   • Smokeless tobacco: Never   Substance Use Topics   • Alcohol use: No   • Drug use: Yes     Types: Benzodiazepines       No Known Allergies    Current Outpatient Medications on File Prior to Visit   Medication Sig   • amLODIPine (NORVASC) 10 MG tablet Take 1 tablet by mouth Daily.   • Aspirin Adult Low Strength 81 MG EC tablet Take 1 tablet by mouth Daily.   • atorvastatin (LIPITOR) 20 MG tablet Take 1 tablet by mouth Daily.   • buprenorphine-naloxone (SUBOXONE) 8-2 MG per SL tablet Place 1 tablet under the tongue Daily.   • buPROPion XL (WELLBUTRIN XL) 150 MG 24 hr tablet Take 1 tablet by mouth Daily.   • busPIRone (BUSPAR) 10 MG tablet Take 1 tablet by mouth 3 (Three) Times a Day.   • cetirizine (zyrTEC) 10 MG tablet Take 1 tablet by mouth Daily.   • citalopram  "(CeleXA) 20 MG tablet Take 1 tablet by mouth Daily.   • escitalopram (LEXAPRO) 20 MG tablet Take 1 tablet by mouth Daily.   • metoprolol succinate XL (TOPROL-XL) 25 MG 24 hr tablet Take 2 tablets by mouth Daily.   • Naloxegol Oxalate (MOVANTIK) 25 MG tablet Take 1 tablet by mouth Every Morning.   • omeprazole (priLOSEC) 20 MG capsule Take 1 capsule by mouth Daily.   • QUEtiapine (SEROquel) 100 MG tablet Take 1 tablet by mouth Every Night.   • vitamin D (ERGOCALCIFEROL) 46669 units capsule capsule Take 1 capsule by mouth 1 (One) Time Per Week.     No current facility-administered medications on file prior to visit.         The following portions of the patient's history were reviewed and updated as appropriate: allergies, current medications, past family history, past medical history, past social history, past surgical history and problem list.    Review of Systems   Constitutional: Negative.   HENT: Negative.  Negative for congestion.    Eyes: Negative.    Cardiovascular: Positive for leg swelling. Negative for chest pain, cyanosis, dyspnea on exertion, irregular heartbeat, near-syncope, orthopnea, palpitations, paroxysmal nocturnal dyspnea and syncope.   Respiratory: Positive for shortness of breath.    Hematologic/Lymphatic: Negative.    Musculoskeletal: Negative.    Gastrointestinal: Negative.    Neurological: Negative.  Negative for headaches.         Objective:     /81 (BP Location: Right arm, Patient Position: Sitting, Cuff Size: Adult)   Pulse 77   Ht 162.6 cm (64\")   Wt (!) 153 kg (337 lb 12.8 oz)   SpO2 98%   BMI 57.98 kg/m²   Cardiovascular:      PMI at left midclavicular line. Normal rate. Regular rhythm. Normal S1. Normal S2.      Murmurs: There is no murmur.      No gallop. No click. No rub.   Pulses:     Intact distal pulses.   Edema:     Peripheral edema present.     Pretibial: bilateral 3+ pitting edema of the pretibial area.     Ankle: bilateral 3+ pitting edema of the ankle.     Feet: " bilateral 3+ pitting edema of the feet.          Lab Review  Lab Results   Component Value Date     (L) 03/09/2023    K 3.5 03/09/2023    CL 96 (L) 03/09/2023    BUN 19 03/09/2023    CREATININE 2.47 (H) 03/09/2023    GLUCOSE 98 03/09/2023    CALCIUM 8.5 (L) 03/09/2023    ALT 16 03/09/2023    ALKPHOS 86 03/09/2023     No results found for: CKTOTAL  Lab Results   Component Value Date    WBC 10.51 03/09/2023    HGB 12.0 03/09/2023    HCT 35.7 03/09/2023     03/09/2023               Procedures       I personally viewed and interpreted the patient's LAB data        Assessment:     1. Bilateral lower extremity edema    2. Stage 3b chronic kidney disease    3. Coronary artery disease involving native coronary artery of native heart without angina pectoris    4. Mixed hyperlipidemia    5. Multiple risk factors for coronary artery disease    6. Morbid obesity with BMI of 50.0-59.9, adult          Plan:     Bilateral lower extremity edema  Patient has marked bilateral lower extremity edema.  DVT was negative  Echocardiogram showed LV ejection fraction around 61 to 65%  Diastolic functions could not be evaluated because of very poor quality echo.  He is taking Norvasc 10 mg daily which is probably contributing to edema.  She was advised to decrease Norvasc 5 mg daily.  She will continue Toprol-XL 25 mg daily.  She will check her blood pressure closely and may need addition of other medications if blood pressure is elevated today blood pressure is only 119/81.    Renal functions are quite poor for that reason diuretic therapy could not be initiated.    Coronary artery disease nonobstructive we will continue to monitor.    Hyperlipidemia  She is taking Lipitor 20 mg daily.    She is morbidly obese and has multiple risk factors for coronary artery disease.  Weight loss was emphasized.  Patient probably will benefit from bariatric surgery.    Nephrology consult was made per patient's request.  Follow-up  scheduled        No follow-ups on file.

## 2023-04-11 NOTE — PROGRESS NOTES
subjective     Chief Complaint   Patient presents with   • Shortness of Breath     Pt states she is having trouble breathing walking.    • Leg Swelling     Right leg swollen   • Hand Pain     Pt states her right hand is tight, swollen and pain.    • Obesity     Follow up      History of Present Illness    Winnie is 52 years old white female who complains of marked bilateral lower extremity edema up to mid thigh levels and has cellulitis of right lower extremity.    She has history of hypertension and has been taking Norvasc 10 mg daily which is probably contributing to marked edema.    She is also morbidly overweight and has complains of swelling of both hands.    Denies any chest pain or palpitations but complains of being short of breath  She has nonobstructive coronary artery disease 60% LAD lesion 2021    Past Surgical History:   Procedure Laterality Date   • ANKLE SURGERY Right     Broken   • CARDIAC CATHETERIZATION N/A 10/27/2021    Procedure: Left Heart Cath;  Surgeon: Colt Smith MD;  Location: [facility] CATH INVASIVE LOCATION;  Service: Cardiology;  Laterality: N/A;   •  SECTION     •  SECTION     • HYSTERECTOMY      Partial   • TUBAL ABDOMINAL LIGATION       Family History   Problem Relation Age of Onset   • Heart disease Mother    • Obesity Mother    • Hypertension Mother    • Heart attack Mother    • Melanoma Mother    • No Known Problems Father    • Obesity Sister    • Obesity Maternal Grandmother    • Hypertension Maternal Grandmother    • Heart attack Maternal Grandmother    • No Known Problems Maternal Grandfather    • No Known Problems Paternal Grandmother    • No Known Problems Paternal Grandfather      Past Medical History:   Diagnosis Date   • Acute renal failure superimposed on stage 3 chronic kidney disease 2023   • Cancer    • Coronary artery disease 60 % LAD 2022   • Depression    • Hyperlipidemia    • Hypertension      Patient Active Problem  List   Diagnosis   • Chest pain in adult   • Essential hypertension   • Mixed hyperlipidemia   • Generalized anxiety disorder   • Obesity   • Tobacco use   • Family history of premature coronary artery disease   • Multiple risk factors for coronary artery disease   • Palpitations   • Abnormal stress test   • Frequent unifocal PVCs, ventricular triplets   • Morbid obesity with BMI of 50.0-59.9, adult   • Coronary artery disease 60 % LAD, October 2021.   • Stage 3b chronic kidney disease   • Bilateral lower extremity edema       Social History     Tobacco Use   • Smoking status: Every Day     Packs/day: 1.00     Years: 15.00     Pack years: 15.00     Types: Cigarettes   • Smokeless tobacco: Never   Substance Use Topics   • Alcohol use: No   • Drug use: Yes     Types: Benzodiazepines       No Known Allergies    Current Outpatient Medications on File Prior to Visit   Medication Sig   • amLODIPine (NORVASC) 10 MG tablet Take 1 tablet by mouth Daily.   • Aspirin Adult Low Strength 81 MG EC tablet Take 1 tablet by mouth Daily.   • atorvastatin (LIPITOR) 20 MG tablet Take 1 tablet by mouth Daily.   • buprenorphine-naloxone (SUBOXONE) 8-2 MG per SL tablet Place 1 tablet under the tongue Daily.   • buPROPion XL (WELLBUTRIN XL) 150 MG 24 hr tablet Take 1 tablet by mouth Daily.   • busPIRone (BUSPAR) 10 MG tablet Take 1 tablet by mouth 3 (Three) Times a Day.   • cetirizine (zyrTEC) 10 MG tablet Take 1 tablet by mouth Daily.   • citalopram (CeleXA) 20 MG tablet Take 1 tablet by mouth Daily.   • escitalopram (LEXAPRO) 20 MG tablet Take 1 tablet by mouth Daily.   • metoprolol succinate XL (TOPROL-XL) 25 MG 24 hr tablet Take 2 tablets by mouth Daily.   • Naloxegol Oxalate (MOVANTIK) 25 MG tablet Take 1 tablet by mouth Every Morning.   • omeprazole (priLOSEC) 20 MG capsule Take 1 capsule by mouth Daily.   • QUEtiapine (SEROquel) 100 MG tablet Take 1 tablet by mouth Every Night.   • vitamin D (ERGOCALCIFEROL) 40537 units capsule  "capsule Take 1 capsule by mouth 1 (One) Time Per Week.     No current facility-administered medications on file prior to visit.         The following portions of the patient's history were reviewed and updated as appropriate: allergies, current medications, past family history, past medical history, past social history, past surgical history and problem list.    Review of Systems   Constitutional: Negative.   HENT: Negative.  Negative for congestion.    Eyes: Negative.    Cardiovascular: Positive for leg swelling. Negative for chest pain, cyanosis, dyspnea on exertion, irregular heartbeat, near-syncope, orthopnea, palpitations, paroxysmal nocturnal dyspnea and syncope.   Respiratory: Positive for shortness of breath.    Hematologic/Lymphatic: Negative.    Musculoskeletal: Negative.    Gastrointestinal: Negative.    Neurological: Negative.  Negative for headaches.          Objective:     /81 (BP Location: Right arm, Patient Position: Sitting, Cuff Size: Adult)   Pulse 77   Ht 162.6 cm (64\")   Wt (!) 153 kg (337 lb 12.8 oz)   SpO2 98%   BMI 57.98 kg/m²   Cardiovascular:      PMI at left midclavicular line. Normal rate. Regular rhythm. Normal S1. Normal S2.      Murmurs: There is no murmur.      No gallop. No click. No rub.   Pulses:     Intact distal pulses.   Edema:     Peripheral edema present.     Pretibial: bilateral 3+ pitting edema of the pretibial area.     Ankle: bilateral 3+ pitting edema of the ankle.     Feet: bilateral 3+ pitting edema of the feet.          Lab Review  Lab Results   Component Value Date     (L) 03/09/2023    K 3.5 03/09/2023    CL 96 (L) 03/09/2023    BUN 19 03/09/2023    CREATININE 2.47 (H) 03/09/2023    GLUCOSE 98 03/09/2023    CALCIUM 8.5 (L) 03/09/2023    ALT 16 03/09/2023    ALKPHOS 86 03/09/2023     No results found for: CKTOTAL  Lab Results   Component Value Date    WBC 10.51 03/09/2023    HGB 12.0 03/09/2023    HCT 35.7 03/09/2023     03/09/2023             "   Procedures       I personally viewed and interpreted the patient's LAB data         Assessment:     1. Bilateral lower extremity edema    2. Stage 3b chronic kidney disease    3. Coronary artery disease involving native coronary artery of native heart without angina pectoris    4. Mixed hyperlipidemia    5. Multiple risk factors for coronary artery disease    6. Morbid obesity with BMI of 50.0-59.9, adult          Plan:     Bilateral lower extremity edema  Patient has marked bilateral lower extremity edema.  DVT was negative  Echocardiogram showed LV ejection fraction around 61 to 65%  Diastolic functions could not be evaluated because of very poor quality echo.  He is taking Norvasc 10 mg daily which is probably contributing to edema.  She was advised to decrease Norvasc 5 mg daily.  She will continue Toprol-XL 25 mg daily.  She will check her blood pressure closely and may need addition of other medications if blood pressure is elevated today blood pressure is only 119/81.    Renal functions are quite poor for that reason diuretic therapy could not be initiated.    Coronary artery disease nonobstructive we will continue to monitor.    Hyperlipidemia  She is taking Lipitor 20 mg daily.    She is morbidly obese and has multiple risk factors for coronary artery disease.  Weight loss was emphasized.  Patient probably will benefit from bariatric surgery.    Nephrology consult was made per patient's request.  Follow-up scheduled        No follow-ups on file.

## 2023-04-21 ENCOUNTER — TELEPHONE (OUTPATIENT)
Dept: CARDIOLOGY | Facility: CLINIC | Age: 53
End: 2023-04-21
Payer: COMMERCIAL

## 2023-04-21 NOTE — TELEPHONE ENCOUNTER
Advised patient, Per Dr Mar' office we need to send cdurrent labs after the march labs from hospital which showed a critical creatinine level.     She stated she has not had any labs done after that.  Per Dr Mar office she needs to go to the ER or get repeat labs done asap. Tried contacting PCP.  They are out today on fridays.    Per VARUN MANCINI.  Patient can come to get a stat CMP at [facility] since we are here only  half day and due to her living in [place] it would take until tomorrow to get results.   Otherwise she can see her PCP first thing on Monday.  She stated she would call her PCP first thing on Monday to discuss her labs.  Also told her that her PCP also sent a referral to Dr Mercedes as well.

## 2024-02-01 ENCOUNTER — TELEPHONE (OUTPATIENT)
Dept: CARDIOLOGY | Facility: CLINIC | Age: 54
End: 2024-02-01
Payer: COMMERCIAL

## 2024-02-01 NOTE — TELEPHONE ENCOUNTER
"    Caller: Winnie Pittman \"Santa\"    Relationship to patient: Self    Best call back number: [phone]    Chief complaint:     Type of visit: FOLLOW UP    Requested date: ASAP     If rescheduling, when is the original appointment: NONE     Additional notes:PATIENT ADVISING SHE WENT TO THE ER OVER THE WEEKEND, THEY FOUND NOTHING BUT SHE SAYS SOMETHING IS GOING ON WITH HER BODY. SHE SAID WHEN SHE SITS UP SHE IS FINE BUT WHEN SHE LAYS DOWN SHE FEELS LIKE SHE HAS HEARTBURN OR PRESSURE-ADVISING THIS HAS BEEN OFF AND ON FOR AT LEAST 1 MONTH. ALSO STATING OXYGEN WAS LOW BUT NOW IS OKAY-HEARTBEAT FEELS LIKE IT'S \"FLYING\" OXYGEN IS 90 AND HEART BEAT IS 90.PLEASE CALL HER ASAP TO RESCHEDULE AND DISCUSS.         "

## 2024-02-06 ENCOUNTER — TELEPHONE (OUTPATIENT)
Dept: CARDIOLOGY | Facility: CLINIC | Age: 54
End: 2024-02-06
Payer: COMMERCIAL

## 2024-02-06 NOTE — TELEPHONE ENCOUNTER
"    Caller: Winnie Pittman \"Santa\"    Relationship to patient: Self    Best call back number: [phone]    Chief complaint: SHORTNESS OF BREATHE    Type of visit: FOLLOW UP    Requested date: ASAP     If rescheduling, when is the original appointment: 2-14-24     Additional notes: PATIENT IS HAVING ISSUES WITH SHORTNESS OF BREATHE AND PCP SEEMS TO THINK IS COULD BE DUE TO THE BLOCKAGES THAT DR. CORTES FOUND. PATIENT WOULD LIKE TO KNOW IF APPOINTMENT ON 2-14-24 COULD BE MOVED SOONER. PATIENT HAS BEEN ADDED TO WAIT LIST.          "

## 2024-02-07 ENCOUNTER — OFFICE VISIT (OUTPATIENT)
Dept: CARDIOLOGY | Facility: CLINIC | Age: 54
End: 2024-02-07
Payer: COMMERCIAL

## 2024-02-07 VITALS
BODY MASS INDEX: 50.02 KG/M2 | WEIGHT: 293 LBS | DIASTOLIC BLOOD PRESSURE: 78 MMHG | HEART RATE: 67 BPM | HEIGHT: 64 IN | SYSTOLIC BLOOD PRESSURE: 126 MMHG | OXYGEN SATURATION: 98 %

## 2024-02-07 DIAGNOSIS — I25.10 CORONARY ARTERY DISEASE INVOLVING NATIVE CORONARY ARTERY OF NATIVE HEART WITHOUT ANGINA PECTORIS: ICD-10-CM

## 2024-02-07 DIAGNOSIS — F17.201 TOBACCO USE DISORDER, MILD, IN EARLY REMISSION: ICD-10-CM

## 2024-02-07 DIAGNOSIS — R06.00 DYSPNEA, UNSPECIFIED TYPE: Primary | ICD-10-CM

## 2024-02-07 DIAGNOSIS — N18.32 STAGE 3B CHRONIC KIDNEY DISEASE: ICD-10-CM

## 2024-02-07 DIAGNOSIS — E78.2 MIXED HYPERLIPIDEMIA: ICD-10-CM

## 2024-02-07 DIAGNOSIS — I50.31 ACUTE DIASTOLIC CONGESTIVE HEART FAILURE: ICD-10-CM

## 2024-02-07 PROCEDURE — 93000 ELECTROCARDIOGRAM COMPLETE: CPT | Performed by: NURSE PRACTITIONER

## 2024-02-07 PROCEDURE — 99214 OFFICE O/P EST MOD 30 MIN: CPT | Performed by: NURSE PRACTITIONER

## 2024-02-07 PROCEDURE — 1159F MED LIST DOCD IN RCRD: CPT | Performed by: NURSE PRACTITIONER

## 2024-02-07 PROCEDURE — 1160F RVW MEDS BY RX/DR IN RCRD: CPT | Performed by: NURSE PRACTITIONER

## 2024-02-07 PROCEDURE — 3074F SYST BP LT 130 MM HG: CPT | Performed by: NURSE PRACTITIONER

## 2024-02-07 PROCEDURE — 3078F DIAST BP <80 MM HG: CPT | Performed by: NURSE PRACTITIONER

## 2024-02-07 RX ORDER — FUROSEMIDE 20 MG/1
20 TABLET ORAL DAILY
COMMUNITY
Start: 2024-02-06 | End: 2024-03-07

## 2024-02-07 RX ORDER — HYDROCHLOROTHIAZIDE 12.5 MG/1
12.5 CAPSULE, GELATIN COATED ORAL DAILY
COMMUNITY
Start: 2024-02-06 | End: 2024-03-07

## 2024-02-07 RX ORDER — ALBUTEROL SULFATE AND BUDESONIDE 90; 80 UG/1; UG/1
2 AEROSOL, METERED RESPIRATORY (INHALATION) 3 TIMES DAILY
COMMUNITY
Start: 2024-02-06 | End: 2024-03-07

## 2024-02-07 RX ORDER — LISINOPRIL 40 MG/1
40 TABLET ORAL DAILY
COMMUNITY

## 2024-02-07 NOTE — PROGRESS NOTES
Subjective     Winnie Pittman is a 53 y.o. female.   Chief Complaint   Patient presents with    Shortness of Breath     With exertion       History of Present Illness   Winnie Pittman is a 53-year-old female who presents to clinic today for urgent appt/delay in cardiology follow up.     She has noticed over the last several months progressive worsening exertional dyspnea.  She seen her PCP yesterday with abnormal labs and was started on Lasix and HCTZ.  She reports lower extremity swelling did improve after discontinuing Norvasc.  She has underlying CKD which will need closely monitored.  She was supposed to see nephrology in the past but was unable to keep appt. she does report some orthopnea.  She denies history of sleep apnea but does exhibit symptoms.  She has an upcoming appointment with pulmonology.  She has oxygen she has been using intermittently due to oxygen desaturation.  She also expresses concerns about her heart as she was advised in the past she had mild underlying CAD.  She denies chest pain currently.  She states some episodes of chest discomfort in the past with use of nitroglycerin which helped symptoms.  She is currently on aspirin and statin.  Hypertension currently treated with lisinopril and metoprolol.  She denies any underlying history of DM.      Patient Active Problem List   Diagnosis    Chest pain in adult    Essential hypertension    Mixed hyperlipidemia    Generalized anxiety disorder    Obesity    Tobacco use    Family history of premature coronary artery disease    Multiple risk factors for coronary artery disease    Palpitations    Abnormal stress test    Frequent unifocal PVCs, ventricular triplets    Morbid obesity with BMI of 50.0-59.9, adult    Coronary artery disease 60 % LAD, October 2021.    Stage 3b chronic kidney disease    Bilateral lower extremity edema     Past Medical History:   Diagnosis Date    Acute renal failure superimposed on stage 3 chronic kidney disease  2023    Cancer     Coronary artery disease 60 % LAD 2022    Depression     Hyperlipidemia     Hypertension      Past Surgical History:   Procedure Laterality Date    ANKLE SURGERY Right     Broken    CARDIAC CATHETERIZATION N/A 10/27/2021    Procedure: Left Heart Cath;  Surgeon: Colt Smith MD;  Location:  [facility];  Service: Cardiology;  Laterality: N/A;     SECTION       SECTION      HYSTERECTOMY      Partial    TUBAL ABDOMINAL LIGATION       Family History   Problem Relation Age of Onset    Heart disease Mother     Obesity Mother     Hypertension Mother     Heart attack Mother     Melanoma Mother     No Known Problems Father     Obesity Sister     Obesity Maternal Grandmother     Hypertension Maternal Grandmother     Heart attack Maternal Grandmother     No Known Problems Maternal Grandfather     No Known Problems Paternal Grandmother     No Known Problems Paternal Grandfather      Social History     Tobacco Use    Smoking status: Former     Packs/day: 1.00     Years: 15.00     Additional pack years: 0.00     Total pack years: 15.00     Types: Cigarettes     Quit date: 2024     Years since quittin.0    Smokeless tobacco: Never   Substance Use Topics    Alcohol use: No    Drug use: Yes     Types: Benzodiazepines         The following portions of the patient's history were reviewed and updated as appropriate: allergies, current medications, past family history, past medical history, past social history, past surgical history and problem list.    No Known Allergies      Current Outpatient Medications:     Albuterol-Budesonide (Airsupra) 90-80 MCG/ACT aerosol, Inhale 2 puffs 3 (Three) Times a Day., Disp: , Rfl:     Aspirin Adult Low Strength 81 MG EC tablet, Take 1 tablet by mouth Daily., Disp: , Rfl:     atorvastatin (LIPITOR) 20 MG tablet, Take 1 tablet by mouth Daily., Disp: , Rfl:     buPROPion XL (WELLBUTRIN XL) 150 MG 24 hr tablet, Take 1  tablet by mouth Daily., Disp: , Rfl:     cetirizine (zyrTEC) 10 MG tablet, Take 1 tablet by mouth Daily., Disp: , Rfl:     citalopram (CeleXA) 20 MG tablet, Take 1 tablet by mouth Daily., Disp: , Rfl:     escitalopram (LEXAPRO) 20 MG tablet, Take 1 tablet by mouth Daily., Disp: , Rfl:     furosemide (LASIX) 20 MG tablet, Take 1 tablet by mouth Daily., Disp: , Rfl:     hydroCHLOROthiazide (MICROZIDE) 12.5 MG capsule, Take 1 capsule by mouth Daily., Disp: , Rfl:     metoprolol succinate XL (TOPROL-XL) 25 MG 24 hr tablet, Take 2 tablets by mouth Daily., Disp: 180 tablet, Rfl: 1    Naloxegol Oxalate (MOVANTIK) 25 MG tablet, Take 1 tablet by mouth Every Morning., Disp: , Rfl:     omeprazole (priLOSEC) 20 MG capsule, Take 1 capsule by mouth Daily., Disp: , Rfl:     QUEtiapine (SEROquel) 100 MG tablet, Take 1 tablet by mouth Every Night., Disp: , Rfl:     vitamin D (ERGOCALCIFEROL) 44603 units capsule capsule, Take 1 capsule by mouth 1 (One) Time Per Week., Disp: , Rfl:     amLODIPine (NORVASC) 10 MG tablet, Take 1 tablet by mouth Daily. (Patient not taking: Reported on 2/7/2024), Disp: , Rfl:     busPIRone (BUSPAR) 10 MG tablet, Take 1 tablet by mouth 3 (Three) Times a Day. (Patient not taking: Reported on 2/7/2024), Disp: , Rfl:     lisinopril (PRINIVIL,ZESTRIL) 40 MG tablet, Take 1 tablet by mouth Daily., Disp: , Rfl:     Review of Systems   Constitutional:  Negative for activity change, appetite change, chills, diaphoresis, fatigue and fever.   HENT:  Negative for congestion, drooling, ear discharge, ear pain, mouth sores, nosebleeds, postnasal drip, rhinorrhea, sinus pressure, sneezing and sore throat.    Eyes:  Negative for pain, discharge and visual disturbance.   Respiratory:  Positive for shortness of breath. Negative for cough, chest tightness and wheezing.    Cardiovascular:  Positive for chest pain and leg swelling. Negative for palpitations.   Gastrointestinal:  Negative for abdominal pain, constipation,  "diarrhea, nausea and vomiting.   Endocrine: Negative for cold intolerance, heat intolerance, polydipsia, polyphagia and polyuria.   Musculoskeletal:  Negative for arthralgias, myalgias and neck pain.   Skin:  Negative for rash and wound.   Neurological:  Negative for dizziness, syncope, speech difficulty, weakness, light-headedness and headaches.   Hematological:  Negative for adenopathy. Does not bruise/bleed easily.   Psychiatric/Behavioral:  Negative for confusion, dysphoric mood and sleep disturbance. The patient is not nervous/anxious.    All other systems reviewed and are negative.    /78 (BP Location: Left arm, Patient Position: Sitting, Cuff Size: Large Adult)   Pulse 67   Ht 162.6 cm (64\")   Wt (!) 148 kg (327 lb)   SpO2 98%   BMI 56.13 kg/m²     Objective   No Known Allergies    Physical Exam  Vitals reviewed.   Constitutional:       Appearance: Normal appearance. She is well-developed. She is obese.   HENT:      Head: Normocephalic.   Eyes:      Conjunctiva/sclera: Conjunctivae normal.   Neck:      Thyroid: No thyromegaly.      Vascular: No carotid bruit or JVD.   Cardiovascular:      Rate and Rhythm: Normal rate and regular rhythm.   Pulmonary:      Effort: Pulmonary effort is normal.      Breath sounds: Normal breath sounds.   Musculoskeletal:      Cervical back: Neck supple.      Right lower leg: No edema.      Left lower leg: No edema.   Skin:     General: Skin is warm and dry.   Neurological:      Mental Status: She is alert and oriented to person, place, and time.   Psychiatric:         Attention and Perception: Attention normal.         Mood and Affect: Mood normal.         Speech: Speech normal.         Behavior: Behavior normal. Behavior is cooperative.         Cognition and Memory: Cognition normal.           ECG 12 Lead    Date/Time: 2/7/2024 4:15 PM  Performed by: Denise Lizama APRN    Authorized by: Denise Lizama APRN  Comparison: compared with previous ECG   Similar to " previous ECG  Rhythm: sinus rhythm  BPM: 68  Other findings: low voltage    Clinical impression: non-specific ECG  Comments: QT/QTc - 405/422          LABS  WBC   Date Value Ref Range Status   03/09/2023 10.51 3.40 - 10.80 10*3/mm3 Final     RBC   Date Value Ref Range Status   03/09/2023 3.49 (L) 3.77 - 5.28 10*6/mm3 Final     Hemoglobin   Date Value Ref Range Status   03/09/2023 12.0 12.0 - 15.9 g/dL Final     Hematocrit   Date Value Ref Range Status   03/09/2023 35.7 34.0 - 46.6 % Final     MCV   Date Value Ref Range Status   03/09/2023 102.3 (H) 79.0 - 97.0 fL Final     MCH   Date Value Ref Range Status   03/09/2023 34.4 (H) 26.6 - 33.0 pg Final     MCHC   Date Value Ref Range Status   03/09/2023 33.6 31.5 - 35.7 g/dL Final     RDW   Date Value Ref Range Status   03/09/2023 14.1 12.3 - 15.4 % Final     RDW-SD   Date Value Ref Range Status   03/09/2023 52.4 37.0 - 54.0 fl Final     MPV   Date Value Ref Range Status   03/09/2023 8.3 6.0 - 12.0 fL Final     Platelets   Date Value Ref Range Status   03/09/2023 226 140 - 450 10*3/mm3 Final     Neutrophil %   Date Value Ref Range Status   03/09/2023 72.7 42.7 - 76.0 % Final     Lymphocyte %   Date Value Ref Range Status   03/09/2023 20.0 19.6 - 45.3 % Final     Monocyte %   Date Value Ref Range Status   03/09/2023 5.7 5.0 - 12.0 % Final     Eosinophil %   Date Value Ref Range Status   03/09/2023 1.0 0.3 - 6.2 % Final     Basophil %   Date Value Ref Range Status   03/09/2023 0.2 0.0 - 1.5 % Final     Immature Grans %   Date Value Ref Range Status   03/09/2023 0.4 0.0 - 0.5 % Final     Neutrophils, Absolute   Date Value Ref Range Status   03/09/2023 7.64 (H) 1.70 - 7.00 10*3/mm3 Final     Lymphocytes, Absolute   Date Value Ref Range Status   03/09/2023 2.10 0.70 - 3.10 10*3/mm3 Final     Monocytes, Absolute   Date Value Ref Range Status   03/09/2023 0.60 0.10 - 0.90 10*3/mm3 Final     Eosinophils, Absolute   Date Value Ref Range Status   03/09/2023 0.11 0.00 - 0.40  "10*3/mm3 Final     Basophils, Absolute   Date Value Ref Range Status   03/09/2023 0.02 0.00 - 0.20 10*3/mm3 Final     Immature Grans, Absolute   Date Value Ref Range Status   03/09/2023 0.04 0.00 - 0.05 10*3/mm3 Final     nRBC   Date Value Ref Range Status   03/09/2023 0.0 0.0 - 0.2 /100 WBC Final     N-Terminal Probnp  Order: [id number]  Component  Ref Range & Units 1 d ago   N Terminal Pro BNP  <=125.0 pg/mL 137.4 High    Resulting Agency [facility]     Specimen Collected: 02/06/24 12:59    Performed by: [facility] Last Resulted: 02/06/24 15:26   Received From: [facility]  Result Received: 02/07/24 13:43       Contains abnormal data Comprehensive metabolic panel  Order: [id number]  Component  Ref Range & Units 1 d ago   Sodium  136 - 145 mmol/L 140   Potassium  3.5 - 5.1 mmol/L 3.6   Chloride  98 - 107 mmol/L 97 Low    Carbon Dioxide  22.0 - 29.0 mmol/L 30.0 High    Anion Gap  5 - 15 mmol/L 13   BUN  6.0 - 20.0 mg/dL 10.2   Creatinine  0.50 - 0.90 mg/dL 1.19 High    BUN/Creatinine Ratio 8.6   Glucose  70 - 105 mg/dL 103   Calcium  8.4 - 10.2 mg/dL 9.6   AST  <=32 U/L 18   ALT  <=33 U/L 7   Alkaline Phosphatase  35 - 104 U/L 83   Protein, Total  6.6 - 8.7 g/dL 7.4   Albumin  3.50 - 5.20 g/dL 4.10   Globulin  2.3 - 3.5 g/dL 3.3   A/G Ratio  1.1 - 2.1 1.2   Bilirubin, Total  <=1.00 mg/dL 0.56   Osmolality Calc  275 - 295 mosm/kg 270 Low    eGFR  mL/min/ 54.4     Lipid panel  Order: [id number]  Component  Ref Range & Units 1 d ago   Triglycerides  <=150 mg/dL 95   Cholesterol, Total  <=200.00 mg/dL 185.10   Comment: Less than 200 mg/dL    Low Risk  201 - 239 mg/dL        Borderline Risk  240 mg/dL and greater  High Risk   HDL Cholesterol  45.00 - 65.00 mg/dL 47.70   Comment: National Cholesterol Education Program (NCEP)Guidelines:  <40 mg/dL: Low HDL-cholesterol (major risk factor for CHD)  >=60 mg/dL: High HDL-cholesterol (\"negative\" risk factor for CHD)  HDL-cholesterol is affected " by a number of factors, e.g. smoking, exercise, hormones, sex and age.   LDL Cholesterol, Calc  <=130.0 mg/dL 118.4   Chol/HDL Ratio 3.9   LDL/HDL Ratio 2.5   VLDL  mg/dL 19   Non-HDL Cholesterol  mg/dL 137   Resulting Agency [facility]     IMAGING   CT Chest With Contrast Diagnostic    Result Date: 1/7/2024       1.   There is mild mosaic groundglass attenuation within the bilateral lungs, which can be seen in the setting of chronic small airways disease, edema, or less likely infiltrates, among other etiologies.      2.   Hypoattenuating right thyroid lobe nodule measuring 1.5 cm, further evaluation with thyroid ultrasound is suggested.  3. Fluid within the mildly distended distal esophagus is suggestive of reflux.  4. Pulmonary trunk is enlarged, measuring 3.3 cm. May be suggestive of pulmonary artery hypertension.  5. No right lung mass seen to correlate with the findings on the corresponding chest radiograph from the same day.       RECOMMENDATIONS:     Further follow-up and management of incidental thyroid nodule(s) based on the 2015 ACR white paper is as follows:  > 35-year-old patient:  -Nodule < 1.5 cm: No further follow-up necessary.  -Nodule > 1.5 cm: Follow-up thyroid ultrasound.  Created by: Anshul Armendariz MD  Signed by: Anshul Armendariz MD  Signed on: 1/7/2024 1:54 AM  Location: [id number]            XR Chest 1 View    Result Date: 1/6/2024       1.   Bilateral perceived increased interstitial markings may suggest interstitial edema or infectious/inflammatory bronchiolitis.      2.   Prominent opacity in the right infrahilar region measuring up to 3.9 cm. May represent a mass or superimposed soft tissue densities. Suggest further evaluation with chest CT.       Created by: Anshul Armendariz MD  Signed by: Anshul Armendariz MD  Signed on: 1/6/2024 11:26 PM  Location: [id number]            Cardiac Cath 10/27/2017   Conclusion    Prox LAD lesion is 60% stenosed at the take off of a diagonal branch.  This is smooth plaque.  CX and RCA are normal  No intervention required at this time.  Recommend aggressive risk factor modification and optimal medical therapy coupled with weight loss.  Right TR band     Recommendations         Optimize medical therapy.         Assessment & Plan   Diagnoses and all orders for this visit:    1. Dyspnea, unspecified type (Primary)  -     ECG 12 Lead  -     Stress Test With Myocardial Perfusion (1 Day); Future  EKG reviewed and discussed, no acute changes  Likely multifactorial    2. Coronary artery disease involving native coronary artery of native heart without angina pectoris  Patient reports intermittent chest discomfort and use of nitroglycerin.  Cardiac cath in 2021 with LAD lesion at 60%.  Continue on aspirin, statin and beta-blocker.  Will arrange for nuclear stress test  Advised if new or worsening symptoms while awaiting workup, go to the ER.  She expresses understanding    3. Mixed hyperlipidemia  Continue on statin, reviewed pleat recent labs with LDL at 119, her goal is less than 70.  Would recommend increasing Lipitor to 40 mg nightly    4. Acute diastolic congestive heart failure  Reviewed recent labs with elevated BNP which could likely be contributing to shortness of air.  Continue on Lasix and HCTZ as prescribed by primary, recommend close monitoring of kidney function due to underlying mild CKD.  Lengthy discussion of sodium restrictions and fluid restrictions recommend evaluation for sleep apnea which she can further discuss at her upcoming pulmonology appointment.    5. Stage 3b chronic kidney disease  Reviewed recent labs with creatinine stable, recommend nephrology follow-up/evaluate    6. Tobacco use disorder, mild, in early remission  Will continue to monitor, congratulated on smoking cessation        Review of medical record    Review of lab    Lifestyle modifications including heart healthy diet, regular exercise, maintenance of desirable body weight and  avoidance of tobacco product    Follow-up in 4 weeks to discuss and review testing, sooner if needed.

## 2024-02-07 NOTE — LETTER
February 7, 2024     ADDIS Higuera  [id number]  [place] [address]    Patient: Winnie Pittman   YOB: 1970   Date of Visit: 2/7/2024       Dear ADDIS Higuera    Winnie Pittman was in my office today. Below is a copy of my note.    If you have questions, please do not hesitate to call me. I look forward to following Winnie along with you.         Sincerely,        ADDIS Santiago        CC: No Recipients    Subjective    Winnie Pittman is a 53 y.o. female.   Chief Complaint   Patient presents with   • Shortness of Breath     With exertion       History of Present Illness   Winnie Pittman is a 53-year-old female who presents to clinic today for urgent appt/delay in cardiology follow up.     She has noticed over the last several months progressive worsening exertional dyspnea.  She seen her PCP yesterday with abnormal labs and was started on Lasix and HCTZ.  She reports lower extremity swelling did improve after discontinuing Norvasc.  She has underlying CKD which will need closely monitored.  She was supposed to see nephrology in the past but was unable to keep appt. she does report some orthopnea.  She denies history of sleep apnea but does exhibit symptoms.  She has an upcoming appointment with pulmonology.  She has oxygen she has been using intermittently due to oxygen desaturation.  She also expresses concerns about her heart as she was advised in the past she had mild underlying CAD.  She denies chest pain currently.  She states some episodes of chest discomfort in the past with use of nitroglycerin which helped symptoms.  She is currently on aspirin and statin.  Hypertension currently treated with lisinopril and metoprolol.  She denies any underlying history of DM.      Patient Active Problem List   Diagnosis   • Chest pain in adult   • Essential hypertension   • Mixed hyperlipidemia   • Generalized anxiety disorder   • Obesity   • Tobacco use   • Family  history of premature coronary artery disease   • Multiple risk factors for coronary artery disease   • Palpitations   • Abnormal stress test   • Frequent unifocal PVCs, ventricular triplets   • Morbid obesity with BMI of 50.0-59.9, adult   • Coronary artery disease 60 % LAD, 2021.   • Stage 3b chronic kidney disease   • Bilateral lower extremity edema     Past Medical History:   Diagnosis Date   • Acute renal failure superimposed on stage 3 chronic kidney disease 2023   • Cancer    • Coronary artery disease 60 % LAD 2022   • Depression    • Hyperlipidemia    • Hypertension      Past Surgical History:   Procedure Laterality Date   • ANKLE SURGERY Right     Broken   • CARDIAC CATHETERIZATION N/A 10/27/2021    Procedure: Left Heart Cath;  Surgeon: Colt Smith MD;  Location: [facility] CATH INVASIVE LOCATION;  Service: Cardiology;  Laterality: N/A;   •  SECTION     •  SECTION     • HYSTERECTOMY      Partial   • TUBAL ABDOMINAL LIGATION       Family History   Problem Relation Age of Onset   • Heart disease Mother    • Obesity Mother    • Hypertension Mother    • Heart attack Mother    • Melanoma Mother    • No Known Problems Father    • Obesity Sister    • Obesity Maternal Grandmother    • Hypertension Maternal Grandmother    • Heart attack Maternal Grandmother    • No Known Problems Maternal Grandfather    • No Known Problems Paternal Grandmother    • No Known Problems Paternal Grandfather      Social History     Tobacco Use   • Smoking status: Former     Packs/day: 1.00     Years: 15.00     Additional pack years: 0.00     Total pack years: 15.00     Types: Cigarettes     Quit date: 2024     Years since quittin.0   • Smokeless tobacco: Never   Substance Use Topics   • Alcohol use: No   • Drug use: Yes     Types: Benzodiazepines         The following portions of the patient's history were reviewed and updated as appropriate: allergies, current medications, past family  history, past medical history, past social history, past surgical history and problem list.    No Known Allergies      Current Outpatient Medications:   •  Albuterol-Budesonide (Airsupra) 90-80 MCG/ACT aerosol, Inhale 2 puffs 3 (Three) Times a Day., Disp: , Rfl:   •  Aspirin Adult Low Strength 81 MG EC tablet, Take 1 tablet by mouth Daily., Disp: , Rfl:   •  atorvastatin (LIPITOR) 20 MG tablet, Take 1 tablet by mouth Daily., Disp: , Rfl:   •  buPROPion XL (WELLBUTRIN XL) 150 MG 24 hr tablet, Take 1 tablet by mouth Daily., Disp: , Rfl:   •  cetirizine (zyrTEC) 10 MG tablet, Take 1 tablet by mouth Daily., Disp: , Rfl:   •  citalopram (CeleXA) 20 MG tablet, Take 1 tablet by mouth Daily., Disp: , Rfl:   •  escitalopram (LEXAPRO) 20 MG tablet, Take 1 tablet by mouth Daily., Disp: , Rfl:   •  furosemide (LASIX) 20 MG tablet, Take 1 tablet by mouth Daily., Disp: , Rfl:   •  hydroCHLOROthiazide (MICROZIDE) 12.5 MG capsule, Take 1 capsule by mouth Daily., Disp: , Rfl:   •  metoprolol succinate XL (TOPROL-XL) 25 MG 24 hr tablet, Take 2 tablets by mouth Daily., Disp: 180 tablet, Rfl: 1  •  Naloxegol Oxalate (MOVANTIK) 25 MG tablet, Take 1 tablet by mouth Every Morning., Disp: , Rfl:   •  omeprazole (priLOSEC) 20 MG capsule, Take 1 capsule by mouth Daily., Disp: , Rfl:   •  QUEtiapine (SEROquel) 100 MG tablet, Take 1 tablet by mouth Every Night., Disp: , Rfl:   •  vitamin D (ERGOCALCIFEROL) 35018 units capsule capsule, Take 1 capsule by mouth 1 (One) Time Per Week., Disp: , Rfl:   •  amLODIPine (NORVASC) 10 MG tablet, Take 1 tablet by mouth Daily. (Patient not taking: Reported on 2/7/2024), Disp: , Rfl:   •  busPIRone (BUSPAR) 10 MG tablet, Take 1 tablet by mouth 3 (Three) Times a Day. (Patient not taking: Reported on 2/7/2024), Disp: , Rfl:   •  lisinopril (PRINIVIL,ZESTRIL) 40 MG tablet, Take 1 tablet by mouth Daily., Disp: , Rfl:     Review of Systems   Constitutional:  Negative for activity change, appetite change, chills,  "diaphoresis, fatigue and fever.   HENT:  Negative for congestion, drooling, ear discharge, ear pain, mouth sores, nosebleeds, postnasal drip, rhinorrhea, sinus pressure, sneezing and sore throat.    Eyes:  Negative for pain, discharge and visual disturbance.   Respiratory:  Positive for shortness of breath. Negative for cough, chest tightness and wheezing.    Cardiovascular:  Positive for chest pain and leg swelling. Negative for palpitations.   Gastrointestinal:  Negative for abdominal pain, constipation, diarrhea, nausea and vomiting.   Endocrine: Negative for cold intolerance, heat intolerance, polydipsia, polyphagia and polyuria.   Musculoskeletal:  Negative for arthralgias, myalgias and neck pain.   Skin:  Negative for rash and wound.   Neurological:  Negative for dizziness, syncope, speech difficulty, weakness, light-headedness and headaches.   Hematological:  Negative for adenopathy. Does not bruise/bleed easily.   Psychiatric/Behavioral:  Negative for confusion, dysphoric mood and sleep disturbance. The patient is not nervous/anxious.    All other systems reviewed and are negative.    /78 (BP Location: Left arm, Patient Position: Sitting, Cuff Size: Large Adult)   Pulse 67   Ht 162.6 cm (64\")   Wt (!) 148 kg (327 lb)   SpO2 98%   BMI 56.13 kg/m²     Objective  No Known Allergies    Physical Exam  Vitals reviewed.   Constitutional:       Appearance: Normal appearance. She is well-developed. She is obese.   HENT:      Head: Normocephalic.   Eyes:      Conjunctiva/sclera: Conjunctivae normal.   Neck:      Thyroid: No thyromegaly.      Vascular: No carotid bruit or JVD.   Cardiovascular:      Rate and Rhythm: Normal rate and regular rhythm.   Pulmonary:      Effort: Pulmonary effort is normal.      Breath sounds: Normal breath sounds.   Musculoskeletal:      Cervical back: Neck supple.      Right lower leg: No edema.      Left lower leg: No edema.   Skin:     General: Skin is warm and dry. "   Neurological:      Mental Status: She is alert and oriented to person, place, and time.   Psychiatric:         Attention and Perception: Attention normal.         Mood and Affect: Mood normal.         Speech: Speech normal.         Behavior: Behavior normal. Behavior is cooperative.         Cognition and Memory: Cognition normal.           ECG 12 Lead    Date/Time: 2/7/2024 4:15 PM  Performed by: Denise Lizama APRN    Authorized by: Denise Lizama APRN  Comparison: compared with previous ECG   Similar to previous ECG  Rhythm: sinus rhythm  BPM: 68  Other findings: low voltage    Clinical impression: non-specific ECG  Comments: QT/QTc - 405/422          LABS  WBC   Date Value Ref Range Status   03/09/2023 10.51 3.40 - 10.80 10*3/mm3 Final     RBC   Date Value Ref Range Status   03/09/2023 3.49 (L) 3.77 - 5.28 10*6/mm3 Final     Hemoglobin   Date Value Ref Range Status   03/09/2023 12.0 12.0 - 15.9 g/dL Final     Hematocrit   Date Value Ref Range Status   03/09/2023 35.7 34.0 - 46.6 % Final     MCV   Date Value Ref Range Status   03/09/2023 102.3 (H) 79.0 - 97.0 fL Final     MCH   Date Value Ref Range Status   03/09/2023 34.4 (H) 26.6 - 33.0 pg Final     MCHC   Date Value Ref Range Status   03/09/2023 33.6 31.5 - 35.7 g/dL Final     RDW   Date Value Ref Range Status   03/09/2023 14.1 12.3 - 15.4 % Final     RDW-SD   Date Value Ref Range Status   03/09/2023 52.4 37.0 - 54.0 fl Final     MPV   Date Value Ref Range Status   03/09/2023 8.3 6.0 - 12.0 fL Final     Platelets   Date Value Ref Range Status   03/09/2023 226 140 - 450 10*3/mm3 Final     Neutrophil %   Date Value Ref Range Status   03/09/2023 72.7 42.7 - 76.0 % Final     Lymphocyte %   Date Value Ref Range Status   03/09/2023 20.0 19.6 - 45.3 % Final     Monocyte %   Date Value Ref Range Status   03/09/2023 5.7 5.0 - 12.0 % Final     Eosinophil %   Date Value Ref Range Status   03/09/2023 1.0 0.3 - 6.2 % Final     Basophil %   Date Value Ref Range  Status   03/09/2023 0.2 0.0 - 1.5 % Final     Immature Grans %   Date Value Ref Range Status   03/09/2023 0.4 0.0 - 0.5 % Final     Neutrophils, Absolute   Date Value Ref Range Status   03/09/2023 7.64 (H) 1.70 - 7.00 10*3/mm3 Final     Lymphocytes, Absolute   Date Value Ref Range Status   03/09/2023 2.10 0.70 - 3.10 10*3/mm3 Final     Monocytes, Absolute   Date Value Ref Range Status   03/09/2023 0.60 0.10 - 0.90 10*3/mm3 Final     Eosinophils, Absolute   Date Value Ref Range Status   03/09/2023 0.11 0.00 - 0.40 10*3/mm3 Final     Basophils, Absolute   Date Value Ref Range Status   03/09/2023 0.02 0.00 - 0.20 10*3/mm3 Final     Immature Grans, Absolute   Date Value Ref Range Status   03/09/2023 0.04 0.00 - 0.05 10*3/mm3 Final     nRBC   Date Value Ref Range Status   03/09/2023 0.0 0.0 - 0.2 /100 WBC Final     N-Terminal Probnp  Order: [id number]  Component  Ref Range & Units 1 d ago   N Terminal Pro BNP  <=125.0 pg/mL 137.4 High    Resulting Agency [facility]     Specimen Collected: 02/06/24 12:59    Performed by: [facility] Last Resulted: 02/06/24 15:26   Received From: [facility]  Result Received: 02/07/24 13:43       Contains abnormal data Comprehensive metabolic panel  Order: [id number]  Component  Ref Range & Units 1 d ago   Sodium  136 - 145 mmol/L 140   Potassium  3.5 - 5.1 mmol/L 3.6   Chloride  98 - 107 mmol/L 97 Low    Carbon Dioxide  22.0 - 29.0 mmol/L 30.0 High    Anion Gap  5 - 15 mmol/L 13   BUN  6.0 - 20.0 mg/dL 10.2   Creatinine  0.50 - 0.90 mg/dL 1.19 High    BUN/Creatinine Ratio 8.6   Glucose  70 - 105 mg/dL 103   Calcium  8.4 - 10.2 mg/dL 9.6   AST  <=32 U/L 18   ALT  <=33 U/L 7   Alkaline Phosphatase  35 - 104 U/L 83   Protein, Total  6.6 - 8.7 g/dL 7.4   Albumin  3.50 - 5.20 g/dL 4.10   Globulin  2.3 - 3.5 g/dL 3.3   A/G Ratio  1.1 - 2.1 1.2   Bilirubin, Total  <=1.00 mg/dL 0.56   Osmolality Calc  275 - 295 mosm/kg 270 Low    eGFR  mL/min/{1.73_m2} 54.4  "    Lipid panel  Order: [id number]  Component  Ref Range & Units 1 d ago   Triglycerides  <=150 mg/dL 95   Cholesterol, Total  <=200.00 mg/dL 185.10   Comment: Less than 200 mg/dL    Low Risk  201 - 239 mg/dL        Borderline Risk  240 mg/dL and greater  High Risk   HDL Cholesterol  45.00 - 65.00 mg/dL 47.70   Comment: National Cholesterol Education Program (NCEP)Guidelines:  <40 mg/dL: Low HDL-cholesterol (major risk factor for CHD)  >=60 mg/dL: High HDL-cholesterol (\"negative\" risk factor for CHD)  HDL-cholesterol is affected by a number of factors, e.g. smoking, exercise, hormones, sex and age.   LDL Cholesterol, Calc  <=130.0 mg/dL 118.4   Chol/HDL Ratio 3.9   LDL/HDL Ratio 2.5   VLDL  mg/dL 19   Non-HDL Cholesterol  mg/dL 137   [facility]     IMAGING   CT Chest With Contrast Diagnostic    Result Date: 1/7/2024       1.   There is mild mosaic groundglass attenuation within the bilateral lungs, which can be seen in the setting of chronic small airways disease, edema, or less likely infiltrates, among other etiologies.      2.   Hypoattenuating right thyroid lobe nodule measuring 1.5 cm, further evaluation with thyroid ultrasound is suggested.  3. Fluid within the mildly distended distal esophagus is suggestive of reflux.  4. Pulmonary trunk is enlarged, measuring 3.3 cm. May be suggestive of pulmonary artery hypertension.  5. No right lung mass seen to correlate with the findings on the corresponding chest radiograph from the same day.       RECOMMENDATIONS:     Further follow-up and management of incidental thyroid nodule(s) based on the 2015 ACR white paper is as follows:  > 35-year-old patient:  -Nodule < 1.5 cm: No further follow-up necessary.  -Nodule > 1.5 cm: Follow-up thyroid ultrasound.  Created by: Anshul Armendariz MD  Signed by: Anshul Armendariz MD  Signed on: 1/7/2024 1:54 AM  Location: [id number]            XR Chest 1 View    Result Date: 1/6/2024       1.   Bilateral " perceived increased interstitial markings may suggest interstitial edema or infectious/inflammatory bronchiolitis.      2.   Prominent opacity in the right infrahilar region measuring up to 3.9 cm. May represent a mass or superimposed soft tissue densities. Suggest further evaluation with chest CT.       Created by: Anshul Armendariz MD  Signed by: Anshul Armendariz MD  Signed on: 1/6/2024 11:26 PM  Location: [id number]            Cardiac Cath 10/27/2017   Conclusion    Prox LAD lesion is 60% stenosed at the take off of a diagonal branch. This is smooth plaque.  CX and RCA are normal  No intervention required at this time.  Recommend aggressive risk factor modification and optimal medical therapy coupled with weight loss.  Right TR band     Recommendations    •     Optimize medical therapy.         Assessment & Plan  Diagnoses and all orders for this visit:    1. Dyspnea, unspecified type (Primary)  -     ECG 12 Lead  -     Stress Test With Myocardial Perfusion (1 Day); Future  EKG reviewed and discussed, no acute changes  Likely multifactorial    2. Coronary artery disease involving native coronary artery of native heart without angina pectoris  Patient reports intermittent chest discomfort and use of nitroglycerin.  Cardiac cath in 2021 with LAD lesion at 60%.  Continue on aspirin, statin and beta-blocker.  Will arrange for nuclear stress test  Advised if new or worsening symptoms while awaiting workup, go to the ER.  She expresses understanding    3. Mixed hyperlipidemia  Continue on statin, reviewed pleat recent labs with LDL at 119, her goal is less than 70.  Would recommend increasing Lipitor to 40 mg nightly    4. Acute diastolic congestive heart failure  Reviewed recent labs with elevated BNP which could likely be contributing to shortness of air.  Continue on Lasix and HCTZ as prescribed by primary, recommend close monitoring of kidney function due to underlying mild CKD.  Lengthy discussion of sodium restrictions  and fluid restrictions recommend evaluation for sleep apnea which she can further discuss at her upcoming pulmonology appointment.    5. Stage 3b chronic kidney disease  Reviewed recent labs with creatinine stable, recommend nephrology follow-up/evaluate    6. Tobacco use disorder, mild, in early remission  Will continue to monitor, congratulated on smoking cessation        Review of medical record    Review of lab    Lifestyle modifications including heart healthy diet, regular exercise, maintenance of desirable body weight and avoidance of tobacco product    Follow-up in 4 weeks to discuss and review testing, sooner if needed.

## 2024-02-07 NOTE — LETTER
February 7, 2024     ADDIS Higuera  [address]  [place] [address]    Patient: Winnie Pittman   YOB: 1970   Date of Visit: 2/7/2024       Dear ADDIS Higuera    Winnie Pittman was in my office today. Below is a copy of my note.    If you have questions, please do not hesitate to call me. I look forward to following Winnie along with you.         Sincerely,        ADDIS Santiago        CC: No Recipients    Subjective    Winnie Pittman is a 53 y.o. female.   Chief Complaint   Patient presents with   • Shortness of Breath     With exertion       History of Present Illness   Winnie Pittman is a 53-year-old female who presents to clinic today for urgent appt/delay in cardiology follow up.     She has noticed over the last several months progressive worsening exertional dyspnea.  She seen her PCP yesterday with abnormal labs and was started on Lasix and HCTZ.  She reports lower extremity swelling did improve after discontinuing Norvasc.  She has underlying CKD which will need closely monitored.  She was supposed to see nephrology in the past but was unable to keep appt. she does report some orthopnea.  She denies history of sleep apnea but does exhibit symptoms.  She has an upcoming appointment with pulmonology.  She has oxygen she has been using intermittently due to oxygen desaturation.  She also expresses concerns about her heart as she was advised in the past she had mild underlying CAD.  She denies chest pain currently.  She states some episodes of chest discomfort in the past with use of nitroglycerin which helped symptoms.  She is currently on aspirin and statin.  Hypertension currently treated with lisinopril and metoprolol.  She denies any underlying history of DM.      Patient Active Problem List   Diagnosis   • Chest pain in adult   • Essential hypertension   • Mixed hyperlipidemia   • Generalized anxiety disorder   • Obesity   • Tobacco use   • Family  history of premature coronary artery disease   • Multiple risk factors for coronary artery disease   • Palpitations   • Abnormal stress test   • Frequent unifocal PVCs, ventricular triplets   • Morbid obesity with BMI of 50.0-59.9, adult   • Coronary artery disease 60 % LAD, 2021.   • Stage 3b chronic kidney disease   • Bilateral lower extremity edema     Past Medical History:   Diagnosis Date   • Acute renal failure superimposed on stage 3 chronic kidney disease 2023   • Cancer    • Coronary artery disease 60 % LAD 2022   • Depression    • Hyperlipidemia    • Hypertension      Past Surgical History:   Procedure Laterality Date   • ANKLE SURGERY Right     Broken   • CARDIAC CATHETERIZATION N/A 10/27/2021    Procedure: Left Heart Cath;  Surgeon: Colt Smith MD;  Location: [facility] CATH [facility]AS[facility];  Service: Cardiology;  Laterality: N/A;   •  SECTION     •  SECTION     • HYSTERECTOMY      Partial   • TUBAL ABDOMINAL LIGATION       Family History   Problem Relation Age of Onset   • Heart disease Mother    • Obesity Mother    • Hypertension Mother    • Heart attack Mother    • Melanoma Mother    • No Known Problems Father    • Obesity Sister    • Obesity Maternal Grandmother    • Hypertension Maternal Grandmother    • Heart attack Maternal Grandmother    • No Known Problems Maternal Grandfather    • No Known Problems Paternal Grandmother    • No Known Problems Paternal Grandfather      Social History     Tobacco Use   • Smoking status: Former     Packs/day: 1.00     Years: 15.00     Additional pack years: 0.00     Total pack years: 15.00     Types: Cigarettes     Quit date: 2024     Years since quittin.0   • Smokeless tobacco: Never   Substance Use Topics   • Alcohol use: No   • Drug use: Yes     Types: Benzodiazepines         The following portions of the patient's history were reviewed and updated as appropriate: allergies, current medications, past family  history, past medical history, past social history, past surgical history and problem list.    No Known Allergies      Current Outpatient Medications:   •  Albuterol-Budesonide (Airsupra) 90-80 MCG/ACT aerosol, Inhale 2 puffs 3 (Three) Times a Day., Disp: , Rfl:   •  Aspirin Adult Low Strength 81 MG EC tablet, Take 1 tablet by mouth Daily., Disp: , Rfl:   •  atorvastatin (LIPITOR) 20 MG tablet, Take 1 tablet by mouth Daily., Disp: , Rfl:   •  buPROPion XL (WELLBUTRIN XL) 150 MG 24 hr tablet, Take 1 tablet by mouth Daily., Disp: , Rfl:   •  cetirizine (zyrTEC) 10 MG tablet, Take 1 tablet by mouth Daily., Disp: , Rfl:   •  citalopram (CeleXA) 20 MG tablet, Take 1 tablet by mouth Daily., Disp: , Rfl:   •  escitalopram (LEXAPRO) 20 MG tablet, Take 1 tablet by mouth Daily., Disp: , Rfl:   •  furosemide (LASIX) 20 MG tablet, Take 1 tablet by mouth Daily., Disp: , Rfl:   •  hydroCHLOROthiazide (MICROZIDE) 12.5 MG capsule, Take 1 capsule by mouth Daily., Disp: , Rfl:   •  metoprolol succinate XL (TOPROL-XL) 25 MG 24 hr tablet, Take 2 tablets by mouth Daily., Disp: 180 tablet, Rfl: 1  •  Naloxegol Oxalate (MOVANTIK) 25 MG tablet, Take 1 tablet by mouth Every Morning., Disp: , Rfl:   •  omeprazole (priLOSEC) 20 MG capsule, Take 1 capsule by mouth Daily., Disp: , Rfl:   •  QUEtiapine (SEROquel) 100 MG tablet, Take 1 tablet by mouth Every Night., Disp: , Rfl:   •  vitamin D (ERGOCALCIFEROL) 40211 units capsule capsule, Take 1 capsule by mouth 1 (One) Time Per Week., Disp: , Rfl:   •  amLODIPine (NORVASC) 10 MG tablet, Take 1 tablet by mouth Daily. (Patient not taking: Reported on 2/7/2024), Disp: , Rfl:   •  busPIRone (BUSPAR) 10 MG tablet, Take 1 tablet by mouth 3 (Three) Times a Day. (Patient not taking: Reported on 2/7/2024), Disp: , Rfl:   •  lisinopril (PRINIVIL,ZESTRIL) 40 MG tablet, Take 1 tablet by mouth Daily., Disp: , Rfl:     Review of Systems   Constitutional:  Negative for activity change, appetite change, chills,  "diaphoresis, fatigue and fever.   HENT:  Negative for congestion, drooling, ear discharge, ear pain, mouth sores, nosebleeds, postnasal drip, rhinorrhea, sinus pressure, sneezing and sore throat.    Eyes:  Negative for pain, discharge and visual disturbance.   Respiratory:  Positive for shortness of breath. Negative for cough, chest tightness and wheezing.    Cardiovascular:  Positive for chest pain and leg swelling. Negative for palpitations.   Gastrointestinal:  Negative for abdominal pain, constipation, diarrhea, nausea and vomiting.   Endocrine: Negative for cold intolerance, heat intolerance, polydipsia, polyphagia and polyuria.   Musculoskeletal:  Negative for arthralgias, myalgias and neck pain.   Skin:  Negative for rash and wound.   Neurological:  Negative for dizziness, syncope, speech difficulty, weakness, light-headedness and headaches.   Hematological:  Negative for adenopathy. Does not bruise/bleed easily.   Psychiatric/Behavioral:  Negative for confusion, dysphoric mood and sleep disturbance. The patient is not nervous/anxious.    All other systems reviewed and are negative.    /78 (BP Location: Left arm, Patient Position: Sitting, Cuff Size: Large Adult)   Pulse 67   Ht 162.6 cm (64\")   Wt (!) 148 kg (327 lb)   SpO2 98%   BMI 56.13 kg/m²     Objective  No Known Allergies    Physical Exam  Vitals reviewed.   Constitutional:       Appearance: Normal appearance. She is well-developed. She is obese.   HENT:      Head: Normocephalic.   Eyes:      Conjunctiva/sclera: Conjunctivae normal.   Neck:      Thyroid: No thyromegaly.      Vascular: No carotid bruit or JVD.   Cardiovascular:      Rate and Rhythm: Normal rate and regular rhythm.   Pulmonary:      Effort: Pulmonary effort is normal.      Breath sounds: Normal breath sounds.   Musculoskeletal:      Cervical back: Neck supple.      Right lower leg: No edema.      Left lower leg: No edema.   Skin:     General: Skin is warm and dry. "   Neurological:      Mental Status: She is alert and oriented to person, place, and time.   Psychiatric:         Attention and Perception: Attention normal.         Mood and Affect: Mood normal.         Speech: Speech normal.         Behavior: Behavior normal. Behavior is cooperative.         Cognition and Memory: Cognition normal.           ECG 12 Lead    Date/Time: 2/7/2024 4:15 PM  Performed by: Denise Lizama APRN    Authorized by: Denise Lizama APRN  Comparison: compared with previous ECG   Similar to previous ECG  Rhythm: sinus rhythm  BPM: 68  Other findings: low voltage    Clinical impression: non-specific ECG  Comments: QT/QTc - 405/422          LABS  WBC   Date Value Ref Range Status   03/09/2023 10.51 3.40 - 10.80 10*3/mm3 Final     RBC   Date Value Ref Range Status   03/09/2023 3.49 (L) 3.77 - 5.28 10*6/mm3 Final     Hemoglobin   Date Value Ref Range Status   03/09/2023 12.0 12.0 - 15.9 g/dL Final     Hematocrit   Date Value Ref Range Status   03/09/2023 35.7 34.0 - 46.6 % Final     MCV   Date Value Ref Range Status   03/09/2023 102.3 (H) 79.0 - 97.0 fL Final     MCH   Date Value Ref Range Status   03/09/2023 34.4 (H) 26.6 - 33.0 pg Final     MCHC   Date Value Ref Range Status   03/09/2023 33.6 31.5 - 35.7 g/dL Final     RDW   Date Value Ref Range Status   03/09/2023 14.1 12.3 - 15.4 % Final     RDW-SD   Date Value Ref Range Status   03/09/2023 52.4 37.0 - 54.0 fl Final     MPV   Date Value Ref Range Status   03/09/2023 8.3 6.0 - 12.0 fL Final     Platelets   Date Value Ref Range Status   03/09/2023 226 140 - 450 10*3/mm3 Final     Neutrophil %   Date Value Ref Range Status   03/09/2023 72.7 42.7 - 76.0 % Final     Lymphocyte %   Date Value Ref Range Status   03/09/2023 20.0 19.6 - 45.3 % Final     Monocyte %   Date Value Ref Range Status   03/09/2023 5.7 5.0 - 12.0 % Final     Eosinophil %   Date Value Ref Range Status   03/09/2023 1.0 0.3 - 6.2 % Final     Basophil %   Date Value Ref Range  Status   03/09/2023 0.2 0.0 - 1.5 % Final     Immature Grans %   Date Value Ref Range Status   03/09/2023 0.4 0.0 - 0.5 % Final     Neutrophils, Absolute   Date Value Ref Range Status   03/09/2023 7.64 (H) 1.70 - 7.00 10*3/mm3 Final     Lymphocytes, Absolute   Date Value Ref Range Status   03/09/2023 2.10 0.70 - 3.10 10*3/mm3 Final     Monocytes, Absolute   Date Value Ref Range Status   03/09/2023 0.60 0.10 - 0.90 10*3/mm3 Final     Eosinophils, Absolute   Date Value Ref Range Status   03/09/2023 0.11 0.00 - 0.40 10*3/mm3 Final     Basophils, Absolute   Date Value Ref Range Status   03/09/2023 0.02 0.00 - 0.20 10*3/mm3 Final     Immature Grans, Absolute   Date Value Ref Range Status   03/09/2023 0.04 0.00 - 0.05 10*3/mm3 Final     nRBC   Date Value Ref Range Status   03/09/2023 0.0 0.0 - 0.2 /100 WBC Final     N-Terminal Probnp  Order: [id number]  Component  Ref Range & Units 1 d ago   N Terminal Pro BNP  <=125.0 pg/mL 137.4 High    Resulting Agency [facility]     Specimen Collected: 02/06/24 12:59    Performed by: [facility] Last Resulted: 02/06/24 15:26   Received From: [facility]  Result Received: 02/07/24 13:43       Contains abnormal data Comprehensive metabolic panel  Order: [id number]  Component  Ref Range & Units 1 d ago   Sodium  136 - 145 mmol/L 140   Potassium  3.5 - 5.1 mmol/L 3.6   Chloride  98 - 107 mmol/L 97 Low    Carbon Dioxide  22.0 - 29.0 mmol/L 30.0 High    Anion Gap  5 - 15 mmol/L 13   BUN  6.0 - 20.0 mg/dL 10.2   Creatinine  0.50 - 0.90 mg/dL 1.19 High    BUN/Creatinine Ratio 8.6   Glucose  70 - 105 mg/dL 103   Calcium  8.4 - 10.2 mg/dL 9.6   AST  <=32 U/L 18   ALT  <=33 U/L 7   Alkaline Phosphatase  35 - 104 U/L 83   Protein, Total  6.6 - 8.7 g/dL 7.4   Albumin  3.50 - 5.20 g/dL 4.10   Globulin  2.3 - 3.5 g/dL 3.3   A/G Ratio  1.1 - 2.1 1.2   Bilirubin, Total  <=1.00 mg/dL 0.56   Osmolality Calc  275 - 295 mosm/kg 270 Low    eGFR  mL/min/ 54.4     Lipid  "panel  Order: [id number]  Component  Ref Range & Units 1 d ago   Triglycerides  <=150 mg/dL 95   Cholesterol, Total  <=200.00 mg/dL 185.10   Comment: Less than 200 mg/dL    Low Risk  201 - 239 mg/dL        Borderline Risk  240 mg/dL and greater  High Risk   HDL Cholesterol  45.00 - 65.00 mg/dL 47.70   Comment: National Cholesterol Education Program (NCEP)Guidelines:  <40 mg/dL: Low HDL-cholesterol (major risk factor for CHD)  >=60 mg/dL: High HDL-cholesterol (\"negative\" risk factor for CHD)  HDL-cholesterol is affected by a number of factors, e.g. smoking, exercise, hormones, sex and age.   LDL Cholesterol, Calc  <=130.0 mg/dL 118.4   Chol/HDL Ratio 3.9   LDL/HDL Ratio 2.5   VLDL  mg/dL 19   Non-HDL Cholesterol  mg/dL 137   [facility]     IMAGING   CT Chest With Contrast Diagnostic    Result Date: 1/7/2024       1.   There is mild mosaic groundglass attenuation within the bilateral lungs, which can be seen in the setting of chronic small airways disease, edema, or less likely infiltrates, among other etiologies.      2.   Hypoattenuating right thyroid lobe nodule measuring 1.5 cm, further evaluation with thyroid ultrasound is suggested.  3. Fluid within the mildly distended distal esophagus is suggestive of reflux.  4. Pulmonary trunk is enlarged, measuring 3.3 cm. May be suggestive of pulmonary artery hypertension.  5. No right lung mass seen to correlate with the findings on the corresponding chest radiograph from the same day.       RECOMMENDATIONS:     Further follow-up and management of incidental thyroid nodule(s) based on the 2015 ACR white paper is as follows:  > 35-year-old patient:  -Nodule < 1.5 cm: No further follow-up necessary.  -Nodule > 1.5 cm: Follow-up thyroid ultrasound.  Created by: Anshul Armendariz MD  Signed by: Anshul Armendariz MD  Signed on: 1/7/2024 1:54 AM  Location: [id number]            XR Chest 1 View    Result Date: 1/6/2024       1.   Bilateral perceived " increased interstitial markings may suggest interstitial edema or infectious/inflammatory bronchiolitis.      2.   Prominent opacity in the right infrahilar region measuring up to 3.9 cm. May represent a mass or superimposed soft tissue densities. Suggest further evaluation with chest CT.       Created by: Anshul Armendariz MD  Signed by: Anshul Armendariz MD  Signed on: 1/6/2024 11:26 PM  Location: [id number]            Cardiac Cath 10/27/2017   Conclusion    Prox LAD lesion is 60% stenosed at the take off of a diagonal branch. This is smooth plaque.  CX and RCA are normal  No intervention required at this time.  Recommend aggressive risk factor modification and optimal medical therapy coupled with weight loss.  Right TR band     Recommendations    •     Optimize medical therapy.         Assessment & Plan  Diagnoses and all orders for this visit:    1. Dyspnea, unspecified type (Primary)  -     ECG 12 Lead  -     Stress Test With Myocardial Perfusion (1 Day); Future  EKG reviewed and discussed, no acute changes  Likely multifactorial    2. Coronary artery disease involving native coronary artery of native heart without angina pectoris  Patient reports intermittent chest discomfort and use of nitroglycerin.  Cardiac cath in 2021 with LAD lesion at 60%.  Continue on aspirin, statin and beta-blocker.  Will arrange for nuclear stress test  Advised if new or worsening symptoms while awaiting workup, go to the ER.  She expresses understanding    3. Mixed hyperlipidemia  Continue on statin, reviewed pleat recent labs with LDL at 119, her goal is less than 70.  Would recommend increasing Lipitor to 40 mg nightly    4. Acute diastolic congestive heart failure  Reviewed recent labs with elevated BNP which could likely be contributing to shortness of air.  Continue on Lasix and HCTZ as prescribed by primary, recommend close monitoring of kidney function due to underlying mild CKD.  Lengthy discussion of sodium restrictions and fluid  restrictions recommend evaluation for sleep apnea which she can further discuss at her upcoming pulmonology appointment.    5. Stage 3b chronic kidney disease  Reviewed recent labs with creatinine stable, recommend nephrology follow-up/evaluate    6. Tobacco use disorder, mild, in early remission  Will continue to monitor, congratulated on smoking cessation        Review of medical record    Review of lab    Lifestyle modifications including heart healthy diet, regular exercise, maintenance of desirable body weight and avoidance of tobacco product    Follow-up in 4 weeks to discuss and review testing, sooner if needed.

## 2024-02-08 ENCOUNTER — PATIENT ROUNDING (BHMG ONLY) (OUTPATIENT)
Dept: CARDIOLOGY | Facility: CLINIC | Age: 54
End: 2024-02-08
Payer: COMMERCIAL

## 2024-02-08 NOTE — PROGRESS NOTES
February 8, 2024    Hello, may I speak with Winnie Pittman?    My name is Tonya    I am  with [facility]  [facility]  [address] [place] [address] [phone].    Before we get started may I verify your date of birth? 1970    Tell me about your visit with us. What things went well?  Everything was great.       We're always looking for ways to make our patients' experiences even better. Do you have recommendations on ways we may improve?  No    Overall were you satisfied with your first visit to our practice? Yes       I appreciate you taking the time to speak with me today. Is there anything else I can do for you? No      Thank you, and have a great day.

## 2024-02-23 ENCOUNTER — HOSPITAL ENCOUNTER (OUTPATIENT)
Dept: NUCLEAR MEDICINE | Facility: HOSPITAL | Age: 54
Discharge: HOME | End: 2024-02-23
Payer: COMMERCIAL

## 2024-02-23 ENCOUNTER — HOSPITAL ENCOUNTER (OUTPATIENT)
Dept: CARDIOLOGY | Facility: HOSPITAL | Age: 54
Discharge: HOME | End: 2024-02-23
Payer: COMMERCIAL

## 2024-02-23 DIAGNOSIS — R06.00 DYSPNEA, UNSPECIFIED TYPE: ICD-10-CM

## 2024-02-23 PROCEDURE — 0 TECHNETIUM SESTAMIBI: Performed by: NURSE PRACTITIONER

## 2024-02-23 PROCEDURE — 78452 HT MUSCLE IMAGE SPECT MULT: CPT

## 2024-02-23 PROCEDURE — 25010000002 REGADENOSON 0.4 MG/5ML SOLUTION: Performed by: NURSE PRACTITIONER

## 2024-02-23 PROCEDURE — 93017 CV STRESS TEST TRACING ONLY: CPT

## 2024-02-23 PROCEDURE — A9500 TC99M SESTAMIBI: HCPCS | Performed by: NURSE PRACTITIONER

## 2024-02-23 RX ORDER — REGADENOSON 0.08 MG/ML
0.4 INJECTION, SOLUTION INTRAVENOUS
Status: COMPLETED | OUTPATIENT
Start: 2024-02-23 | End: 2024-02-23

## 2024-02-23 RX ADMIN — TECHNETIUM TC 99M SESTAMIBI 1 DOSE: 1 INJECTION INTRAVENOUS at 09:09

## 2024-02-23 RX ADMIN — REGADENOSON 0.4 MG: 0.08 INJECTION, SOLUTION INTRAVENOUS at 10:15

## 2024-02-23 RX ADMIN — TECHNETIUM TC 99M SESTAMIBI 1 DOSE: 1 INJECTION INTRAVENOUS at 10:15

## 2024-02-26 LAB
BH CV NUCLEAR PRIOR STUDY: 3
BH CV REST NUCLEAR ISOTOPE DOSE: 10.3 MCI
BH CV STRESS BP STAGE 1: NORMAL
BH CV STRESS COMMENTS STAGE 1: NORMAL
BH CV STRESS DOSE REGADENOSON STAGE 1: 0.4
BH CV STRESS DURATION MIN STAGE 1: 0
BH CV STRESS DURATION SEC STAGE 1: 10
BH CV STRESS HR STAGE 1: 100
BH CV STRESS NUCLEAR ISOTOPE DOSE: 32.5 MCI
BH CV STRESS PROTOCOL 1: NORMAL
BH CV STRESS RECOVERY BP: NORMAL MMHG
BH CV STRESS RECOVERY HR: 93 BPM
BH CV STRESS STAGE 1: 1
LV EF NUC BP: 73 %
MAXIMAL PREDICTED HEART RATE: 166 BPM
PERCENT MAX PREDICTED HR: 60.24 %
STRESS BASELINE BP: NORMAL MMHG
STRESS BASELINE HR: 84 BPM
STRESS PERCENT HR: 71 %
STRESS POST PEAK BP: NORMAL MMHG
STRESS POST PEAK HR: 100 BPM
STRESS TARGET HR: 141 BPM

## 2024-03-18 ENCOUNTER — TELEPHONE (OUTPATIENT)
Dept: CARDIOLOGY | Facility: CLINIC | Age: 54
End: 2024-03-18
Payer: COMMERCIAL

## 2024-03-18 ENCOUNTER — OFFICE VISIT (OUTPATIENT)
Dept: CARDIOLOGY | Facility: CLINIC | Age: 54
End: 2024-03-18
Payer: COMMERCIAL

## 2024-03-18 VITALS
OXYGEN SATURATION: 96 % | BODY MASS INDEX: 50.02 KG/M2 | HEART RATE: 86 BPM | WEIGHT: 293 LBS | HEIGHT: 64 IN | DIASTOLIC BLOOD PRESSURE: 58 MMHG | SYSTOLIC BLOOD PRESSURE: 124 MMHG

## 2024-03-18 DIAGNOSIS — I50.32 CHRONIC HEART FAILURE WITH PRESERVED EJECTION FRACTION (HFPEF): Primary | ICD-10-CM

## 2024-03-18 DIAGNOSIS — E66.01 MORBID OBESITY WITH BMI OF 50.0-59.9, ADULT: ICD-10-CM

## 2024-03-18 DIAGNOSIS — I10 ESSENTIAL HYPERTENSION: ICD-10-CM

## 2024-03-18 DIAGNOSIS — I25.10 CORONARY ARTERY DISEASE INVOLVING NATIVE CORONARY ARTERY OF NATIVE HEART WITHOUT ANGINA PECTORIS: ICD-10-CM

## 2024-03-18 DIAGNOSIS — N18.31 STAGE 3A CHRONIC KIDNEY DISEASE: ICD-10-CM

## 2024-03-18 PROBLEM — N18.32 STAGE 3B CHRONIC KIDNEY DISEASE: Status: RESOLVED | Noted: 2023-04-11 | Resolved: 2024-03-18

## 2024-03-18 PROCEDURE — 99214 OFFICE O/P EST MOD 30 MIN: CPT | Performed by: INTERNAL MEDICINE

## 2024-03-18 PROCEDURE — 3074F SYST BP LT 130 MM HG: CPT | Performed by: INTERNAL MEDICINE

## 2024-03-18 PROCEDURE — 3078F DIAST BP <80 MM HG: CPT | Performed by: INTERNAL MEDICINE

## 2024-03-18 RX ORDER — HYDROCHLOROTHIAZIDE 12.5 MG/1
CAPSULE, GELATIN COATED ORAL
COMMUNITY
Start: 2024-03-12 | End: 2024-03-18

## 2024-03-18 RX ORDER — SACUBITRIL AND VALSARTAN 24; 26 MG/1; MG/1
1 TABLET, FILM COATED ORAL 2 TIMES DAILY
Qty: 56 TABLET | Refills: 0 | COMMUNITY
Start: 2024-03-18

## 2024-03-18 NOTE — TELEPHONE ENCOUNTER
CALLED AND LEFT A MESSAGE TO NOT START THE NEW MEDICATION UNTIL SHE SPEAKS TO DR CORTES OR MA.  REQUESTED A CALLBACK.

## 2024-03-18 NOTE — LETTER
2024     ADDIS Higuera  [address]  [place] [address]    Patient: Winnie Pittman   YOB: 1970   Date of Visit: 3/18/2024       Dear ADDIS Higuera    Winnie Pittman was in my office today. Below is a copy of my note.    If you have questions, please do not hesitate to call me. I look forward to following Winnie along with you.         Sincerely,        Alysha Arboleda MD        CC: No Recipients    subjective     Chief Complaint   Patient presents with   • Results     STRESS TEST        Problems Addressed This Visit  No diagnosis found.    History of Present Illness          Past Surgical History:   Procedure Laterality Date   • ANKLE SURGERY Right     Broken   • CARDIAC CATHETERIZATION N/A 10/27/2021    Procedure: Left Heart Cath;  Surgeon: Colt Smith MD;  Location: [facility] INVASIVE LOCATION;  Service: Cardiology;  Laterality: N/A;   •  SECTION     •  SECTION     • HYSTERECTOMY      Partial   • TUBAL ABDOMINAL LIGATION       Family History   Problem Relation Age of Onset   • Heart disease Mother    • Obesity Mother    • Hypertension Mother    • Heart attack Mother    • Melanoma Mother    • No Known Problems Father    • Obesity Sister    • Obesity Maternal Grandmother    • Hypertension Maternal Grandmother    • Heart attack Maternal Grandmother    • No Known Problems Maternal Grandfather    • No Known Problems Paternal Grandmother    • No Known Problems Paternal Grandfather      Past Medical History:   Diagnosis Date   • Acute renal failure superimposed on stage 3 chronic kidney disease 2023   • Cancer    • Coronary artery disease 60 % LAD 2022   • Depression    • Hyperlipidemia    • Hypertension      Patient Active Problem List   Diagnosis   • Chest pain in adult   • Essential hypertension   • Mixed hyperlipidemia   • Generalized anxiety disorder   • Obesity   • Tobacco use   • Family history of premature  coronary artery disease   • Multiple risk factors for coronary artery disease   • Palpitations   • Abnormal stress test   • Frequent unifocal PVCs, ventricular triplets   • Morbid obesity with BMI of 50.0-59.9, adult   • Coronary artery disease 60 % LAD, 2021.   • Stage 3b chronic kidney disease   • Bilateral lower extremity edema       Social History     Tobacco Use   • Smoking status: Former     Current packs/day: 0.00     Average packs/day: 1 pack/day for 15.0 years (15.0 ttl pk-yrs)     Types: Cigarettes     Start date: 2009     Quit date: 2024     Years since quittin.1   • Smokeless tobacco: Never   Vaping Use   • Vaping status: Never Used   Substance Use Topics   • Alcohol use: No   • Drug use: Yes     Types: Benzodiazepines       No Known Allergies    Current Outpatient Medications on File Prior to Visit   Medication Sig   • amLODIPine (NORVASC) 10 MG tablet Take 1 tablet by mouth Daily.   • Aspirin Adult Low Strength 81 MG EC tablet Take 1 tablet by mouth Daily.   • atorvastatin (LIPITOR) 20 MG tablet Take 1 tablet by mouth Daily.   • buPROPion XL (WELLBUTRIN XL) 150 MG 24 hr tablet Take 1 tablet by mouth Daily.   • busPIRone (BUSPAR) 10 MG tablet Take 1 tablet by mouth 3 (Three) Times a Day.   • cetirizine (zyrTEC) 10 MG tablet Take 1 tablet by mouth Daily.   • citalopram (CeleXA) 20 MG tablet Take 1 tablet by mouth Daily.   • escitalopram (LEXAPRO) 20 MG tablet Take 1 tablet by mouth Daily.   • hydroCHLOROthiazide (MICROZIDE) 12.5 MG capsule    • lisinopril (PRINIVIL,ZESTRIL) 40 MG tablet Take 1 tablet by mouth Daily.   • Naloxegol Oxalate (MOVANTIK) 25 MG tablet Take 1 tablet by mouth Every Morning.   • omeprazole (priLOSEC) 20 MG capsule Take 1 capsule by mouth Daily.   • QUEtiapine (SEROquel) 100 MG tablet Take 1 tablet by mouth Every Night.   • vitamin D (ERGOCALCIFEROL) 21954 units capsule capsule Take 1 capsule by mouth 1 (One) Time Per Week.   • furosemide (LASIX) 20 MG tablet  "Take 1 tablet by mouth Daily.   • metoprolol succinate XL (TOPROL-XL) 25 MG 24 hr tablet Take 2 tablets by mouth Daily. (Patient not taking: Reported on 3/18/2024)     No current facility-administered medications on file prior to visit.         The following portions of the patient's history were reviewed and updated as appropriate: allergies, current medications, past family history, past medical history, past social history, past surgical history and problem list.    ROS       Objective:     /58 (BP Location: Right arm, Patient Position: Sitting, Cuff Size: Large Adult)   Pulse 86   Ht 162.6 cm (64\")   Wt (!) 146 kg (322 lb 12.8 oz)   SpO2 96%   BMI 55.41 kg/m²   Physical Exam      Lab Review  Lab Results   Component Value Date     (L) 03/09/2023    K 3.5 03/09/2023    CL 96 (L) 03/09/2023    BUN 19 03/09/2023    CREATININE 2.47 (H) 03/09/2023    GLUCOSE 98 03/09/2023    CALCIUM 8.5 (L) 03/09/2023    ALT 16 03/09/2023    ALKPHOS 86 03/09/2023     No results found for: \"CKTOTAL\"  Lab Results   Component Value Date    WBC 10.51 03/09/2023    HGB 12.0 03/09/2023    HCT 35.7 03/09/2023     03/09/2023     No results found for: \"INR\"  No results found for: \"MG\"  No results found for: \"PSA\", \"TSH\"  No results found for: \"BNP\"  No results found for: \"CHLPL\", \"CHOL\", \"TRIG\", \"HDL\", \"VLDL\", \"LDL\"  No results found for: \"LDL\"  No results found for: \"PROBNP\"            Procedures       I personally viewed and interpreted the patient's LAB data         Assessment:   No diagnosis found.      Plan:              No follow-ups on file.    "

## 2024-03-18 NOTE — PROGRESS NOTES
subjective     Chief Complaint   Patient presents with    Results     STRESS TEST        Problems Addressed This Visit  1. Chronic heart failure with preserved ejection fraction (HFpEF)    2. Essential hypertension    3. Coronary artery disease involving native coronary artery of native heart without angina pectoris    4. Morbid obesity with BMI of 50.0-59.9, adult    5. Stage 3a chronic kidney disease        History of Present Illness  Winnie is 53 years old white female who is here for cardiac evaluation because of shortness of breath and lower extremity edema.  Her proBNP has been consistently elevated however the elevation is mild.  She complains of fatigue dyspnea on exertion and bilateral lower extremity edema.  She recently had a stress test done which is negative for significant exercise-induced myocardial ischemia however she is known to have 60% LAD lesion.  She is taking Lasix 20 mg daily.    Hyperlipidemia on Lipitor therapy    Hypertension on Norvasc, lisinopril and hydrochlorothiazide.  Patient is morbidly obese and is thinking about starting Ozempic which will be cardioprotective also    Past Surgical History:   Procedure Laterality Date    ANKLE SURGERY Right     Broken    CARDIAC CATHETERIZATION N/A 10/27/2021    Procedure: Left Heart Cath;  Surgeon: Colt Smith MD;  Location: [facility] CATH INVASIVE LOCATION;  Service: Cardiology;  Laterality: N/A;     SECTION       SECTION      HYSTERECTOMY      Partial    TUBAL ABDOMINAL LIGATION       Family History   Problem Relation Age of Onset    Heart disease Mother     Obesity Mother     Hypertension Mother     Heart attack Mother     Melanoma Mother     No Known Problems Father     Obesity Sister     Obesity Maternal Grandmother     Hypertension Maternal Grandmother     Heart attack Maternal Grandmother     No Known Problems Maternal Grandfather     No Known Problems Paternal Grandmother     No Known Problems Paternal  Grandfather      Past Medical History:   Diagnosis Date    Acute renal failure superimposed on stage 3 chronic kidney disease 2023    Cancer     Coronary artery disease 60 % LAD 2022    Depression     Hyperlipidemia     Hypertension      Patient Active Problem List   Diagnosis    Chest pain in adult    Essential hypertension    Mixed hyperlipidemia    Generalized anxiety disorder    Obesity    Tobacco use    Family history of premature coronary artery disease    Multiple risk factors for coronary artery disease    Palpitations    Abnormal stress test    Frequent unifocal PVCs, ventricular triplets    Morbid obesity with BMI of 50.0-59.9, adult    Coronary artery disease 60 % LAD, 2021.    Bilateral lower extremity edema    Stage 3a chronic kidney disease    Chronic heart failure with preserved ejection fraction (HFpEF)       Social History     Tobacco Use    Smoking status: Former     Current packs/day: 0.00     Average packs/day: 1 pack/day for 15.0 years (15.0 ttl pk-yrs)     Types: Cigarettes     Start date: 2009     Quit date: 2024     Years since quittin.1    Smokeless tobacco: Never   Vaping Use    Vaping status: Never Used   Substance Use Topics    Alcohol use: No    Drug use: Yes     Types: Benzodiazepines       No Known Allergies    Current Outpatient Medications on File Prior to Visit   Medication Sig    amLODIPine (NORVASC) 10 MG tablet Take 1 tablet by mouth Daily.    Aspirin Adult Low Strength 81 MG EC tablet Take 1 tablet by mouth Daily.    atorvastatin (LIPITOR) 20 MG tablet Take 1 tablet by mouth Daily.    buPROPion XL (WELLBUTRIN XL) 150 MG 24 hr tablet Take 1 tablet by mouth Daily.    busPIRone (BUSPAR) 10 MG tablet Take 1 tablet by mouth 3 (Three) Times a Day.    cetirizine (zyrTEC) 10 MG tablet Take 1 tablet by mouth Daily.    citalopram (CeleXA) 20 MG tablet Take 1 tablet by mouth Daily.    escitalopram (LEXAPRO) 20 MG tablet Take 1 tablet by mouth Daily.     "lisinopril (PRINIVIL,ZESTRIL) 40 MG tablet Take 1 tablet by mouth Daily.    Naloxegol Oxalate (MOVANTIK) 25 MG tablet Take 1 tablet by mouth Every Morning.    omeprazole (priLOSEC) 20 MG capsule Take 1 capsule by mouth Daily.    QUEtiapine (SEROquel) 100 MG tablet Take 1 tablet by mouth Every Night.    vitamin D (ERGOCALCIFEROL) 92746 units capsule capsule Take 1 capsule by mouth 1 (One) Time Per Week.    [DISCONTINUED] hydroCHLOROthiazide (MICROZIDE) 12.5 MG capsule     furosemide (LASIX) 20 MG tablet Take 1 tablet by mouth Daily.    [DISCONTINUED] metoprolol succinate XL (TOPROL-XL) 25 MG 24 hr tablet Take 2 tablets by mouth Daily. (Patient not taking: Reported on 3/18/2024)     No current facility-administered medications on file prior to visit.         The following portions of the patient's history were reviewed and updated as appropriate: allergies, current medications, past family history, past medical history, past social history, past surgical history and problem list.    Review of Systems   Constitutional: Negative.   HENT: Negative.  Negative for congestion.    Eyes: Negative.    Cardiovascular:  Positive for dyspnea on exertion and leg swelling. Negative for chest pain, cyanosis, irregular heartbeat, near-syncope, orthopnea, palpitations, paroxysmal nocturnal dyspnea and syncope.   Respiratory:  Positive for shortness of breath.    Hematologic/Lymphatic: Negative.    Musculoskeletal: Negative.    Gastrointestinal: Negative.    Neurological: Negative.  Negative for headaches.          Objective:     /58 (BP Location: Right arm, Patient Position: Sitting, Cuff Size: Large Adult)   Pulse 86   Ht 162.6 cm (64\")   Wt (!) 146 kg (322 lb 12.8 oz)   SpO2 96%   BMI 55.41 kg/m²   Pulmonary:      Effort: Pulmonary effort is normal.      Breath sounds: Normal breath sounds. No stridor. No wheezing. No rhonchi. No rales.   Cardiovascular:      PMI at left midclavicular line. Normal rate. Regular rhythm. " Normal S1. Normal S2.       Murmurs: There is no murmur.      No gallop.  No click. No rub.   Pulses:     Intact distal pulses.   Edema:     Peripheral edema absent.           Lab Review      proBNP 137.4    Procedures       I personally viewed and interpreted the patient's LAB data         Assessment:     1. Chronic heart failure with preserved ejection fraction (HFpEF)    2. Essential hypertension    3. Coronary artery disease involving native coronary artery of native heart without angina pectoris    4. Morbid obesity with BMI of 50.0-59.9, adult    5. Stage 3a chronic kidney disease          Plan:     Winnie is 53 years old white female who has mild nonobstructive coronary artery disease with 60% LAD lesion.  Recent stress test is negative for significant exercise-induced myocardial ischemia.  She complains of shortness of breath dyspnea on exertion and bilateral lower extremity edema.  She had multiple lab work done at [facility] which showed persistently elevated proBNP.  She is also taking amlodipine which could be contributing to ankle edema.  She has history of renal failure which is significantly improved a year ago kidney functions were about 34% estimated GFR and now is 54.4%.    Blood pressure is very well-controlled.  She is morbidly obese and is planning to start Ozempic.  Probably will help her from cardiac standpoint also.    Patient was advised to discontinue hydrochlorothiazide and continue Lasix.  She will discontinue lisinopril and will start Entresto 1 tablet twice a day after 36 hours of last dose of lisinopril.  She will continue amlodipine for now but will stop it after blood pressure is controlled with Entresto.    Samples were given follow-up in 1 month.  She will lab BMP and BNP checked prior to next visit        No follow-ups on file.

## 2024-04-16 ENCOUNTER — TELEPHONE (OUTPATIENT)
Dept: CARDIOLOGY | Facility: CLINIC | Age: 54
End: 2024-04-16

## 2024-04-16 RX ORDER — SACUBITRIL AND VALSARTAN 24; 26 MG/1; MG/1
1 TABLET, FILM COATED ORAL 2 TIMES DAILY
Qty: 60 TABLET | Refills: 0 | Status: SHIPPED | OUTPATIENT
Start: 2024-04-16

## 2024-04-16 NOTE — TELEPHONE ENCOUNTER
Caller: LUCIO GARCIA    Relationship:    Callback number: [phone]   Is it ok to leave a message: [x] Yes [] No    Requested medication for samples: ENTRESTO    How much medication does the patient currently have left: 2 DAYS SUPPLY    Who will be picking up the samples: LUCIO    Do you need information about patient financial assistance for this medication: [] Yes [x] No    Additional details provided: PLEASE CALL WHEN  IS READY.

## 2024-04-22 ENCOUNTER — OFFICE VISIT (OUTPATIENT)
Dept: CARDIOLOGY | Facility: CLINIC | Age: 54
End: 2024-04-22
Payer: COMMERCIAL

## 2024-04-22 VITALS
BODY MASS INDEX: 50.02 KG/M2 | SYSTOLIC BLOOD PRESSURE: 122 MMHG | WEIGHT: 293 LBS | HEART RATE: 81 BPM | OXYGEN SATURATION: 95 % | DIASTOLIC BLOOD PRESSURE: 82 MMHG | HEIGHT: 64 IN

## 2024-04-22 DIAGNOSIS — I10 ESSENTIAL HYPERTENSION: ICD-10-CM

## 2024-04-22 DIAGNOSIS — E78.2 MIXED HYPERLIPIDEMIA: ICD-10-CM

## 2024-04-22 DIAGNOSIS — I50.32 CHRONIC HEART FAILURE WITH PRESERVED EJECTION FRACTION (HFPEF): Primary | ICD-10-CM

## 2024-04-22 DIAGNOSIS — I25.10 CORONARY ARTERY DISEASE INVOLVING NATIVE CORONARY ARTERY OF NATIVE HEART WITHOUT ANGINA PECTORIS: ICD-10-CM

## 2024-04-22 DIAGNOSIS — E66.01 MORBID OBESITY WITH BMI OF 50.0-59.9, ADULT: ICD-10-CM

## 2024-04-22 DIAGNOSIS — R60.0 BILATERAL LOWER EXTREMITY EDEMA: ICD-10-CM

## 2024-04-22 DIAGNOSIS — I49.3 FREQUENT UNIFOCAL PVCS: ICD-10-CM

## 2024-04-22 PROCEDURE — 3074F SYST BP LT 130 MM HG: CPT | Performed by: INTERNAL MEDICINE

## 2024-04-22 PROCEDURE — 99214 OFFICE O/P EST MOD 30 MIN: CPT | Performed by: INTERNAL MEDICINE

## 2024-04-22 PROCEDURE — 3079F DIAST BP 80-89 MM HG: CPT | Performed by: INTERNAL MEDICINE

## 2024-04-22 NOTE — PROGRESS NOTES
subjective     Chief Complaint   Patient presents with    Chronic Heart Failure    Rash     Pt is asking about skin on lower right leg. She had cellulitis and it has caused her skin to become irritated       Problems Addressed This Visit  1. Chronic heart failure with preserved ejection fraction (HFpEF)    2. Coronary artery disease involving native coronary artery of native heart without angina pectoris    3. Essential hypertension    4. Mixed hyperlipidemia    5. Frequent unifocal PVCs, ventricular triplets    6. Bilateral lower extremity edema    7. Morbid obesity with BMI of 50.0-59.9, adult        History of Present Illness    Winnie is 53 years old white female who is here for cardiology follow-up.  She complains of mild bilateral lower extremity edema with stasis dermatitis on the right lower extremity.  Her proBNP was elevated she is doing much better with Entresto.  Blood pressure is very well-controlled we will decrease Norvasc to 5 mg daily and hopefully by next visit we could increase Entresto and DC Norvasc that might help her lower extremity edema.  She is also taking Lasix 20 mg daily  Renal functions are significantly better.    Hyperlipidemia on Lipitor therapy      Past Surgical History:   Procedure Laterality Date    ANKLE SURGERY Right     Broken    CARDIAC CATHETERIZATION N/A 10/27/2021    Procedure: Left Heart Cath;  Surgeon: Colt Smith MD;  Location: [facility] CATH INVASIVE LOCATION;  Service: Cardiology;  Laterality: N/A;     SECTION       SECTION      HYSTERECTOMY      Partial    TUBAL ABDOMINAL LIGATION       Family History   Problem Relation Age of Onset    Heart disease Mother     Obesity Mother     Hypertension Mother     Heart attack Mother     Melanoma Mother     No Known Problems Father     Obesity Sister     Obesity Maternal Grandmother     Hypertension Maternal Grandmother     Heart attack Maternal Grandmother     No Known Problems Maternal  Grandfather     No Known Problems Paternal Grandmother     No Known Problems Paternal Grandfather      Past Medical History:   Diagnosis Date    Acute renal failure superimposed on stage 3 chronic kidney disease 2023    Cancer     Coronary artery disease 60 % LAD 2022    Depression     Hyperlipidemia     Hypertension      Patient Active Problem List   Diagnosis    Chest pain in adult    Essential hypertension    Mixed hyperlipidemia    Generalized anxiety disorder    Obesity    Tobacco use    Family history of premature coronary artery disease    Multiple risk factors for coronary artery disease    Palpitations    Abnormal stress test    Frequent unifocal PVCs, ventricular triplets    Morbid obesity with BMI of 50.0-59.9, adult    Coronary artery disease 60 % LAD, 2021.    Bilateral lower extremity edema    Stage 3a chronic kidney disease    Chronic heart failure with preserved ejection fraction (HFpEF)       Social History     Tobacco Use    Smoking status: Some Days     Current packs/day: 0.00     Average packs/day: 1 pack/day for 15.0 years (15.0 ttl pk-yrs)     Types: Cigarettes     Start date: 2009     Last attempt to quit: 2024     Years since quittin.2     Passive exposure: Past    Smokeless tobacco: Never   Vaping Use    Vaping status: Never Used   Substance Use Topics    Alcohol use: No    Drug use: Yes     Types: Benzodiazepines       No Known Allergies    Current Outpatient Medications on File Prior to Visit   Medication Sig    amLODIPine (NORVASC) 10 MG tablet Take 1 tablet by mouth Daily.    Aspirin Adult Low Strength 81 MG EC tablet Take 1 tablet by mouth Daily.    atorvastatin (LIPITOR) 20 MG tablet Take 1 tablet by mouth Daily.    buPROPion XL (WELLBUTRIN XL) 150 MG 24 hr tablet Take 1 tablet by mouth Daily.    busPIRone (BUSPAR) 10 MG tablet Take 1 tablet by mouth 3 (Three) Times a Day.    cetirizine (zyrTEC) 10 MG tablet Take 1 tablet by mouth Daily.    citalopram  "(CeleXA) 20 MG tablet Take 1 tablet by mouth Daily.    escitalopram (LEXAPRO) 20 MG tablet Take 1 tablet by mouth Daily.    Naloxegol Oxalate (MOVANTIK) 25 MG tablet Take 1 tablet by mouth Every Morning.    omeprazole (priLOSEC) 20 MG capsule Take 1 capsule by mouth Daily.    QUEtiapine (SEROquel) 100 MG tablet Take 1 tablet by mouth Every Night.    sacubitril-valsartan (Entresto) 24-26 MG tablet Take 1 tablet by mouth 2 (Two) Times a Day.    vitamin D (ERGOCALCIFEROL) 65232 units capsule capsule Take 1 capsule by mouth 1 (One) Time Per Week.    furosemide (LASIX) 20 MG tablet Take 1 tablet by mouth Daily.     No current facility-administered medications on file prior to visit.         The following portions of the patient's history were reviewed and updated as appropriate: allergies, current medications, past family history, past medical history, past social history, past surgical history and problem list.    Review of Systems   Constitutional: Negative.   HENT: Negative.  Negative for congestion.    Eyes: Negative.    Cardiovascular:  Positive for leg swelling. Negative for chest pain, cyanosis, dyspnea on exertion, irregular heartbeat, near-syncope, orthopnea, palpitations, paroxysmal nocturnal dyspnea and syncope.   Respiratory: Negative.  Negative for shortness of breath.    Hematologic/Lymphatic: Negative.    Musculoskeletal: Negative.    Gastrointestinal: Negative.    Neurological: Negative.  Negative for headaches.          Objective:     /82 (BP Location: Left arm, Patient Position: Sitting, Cuff Size: Adult)   Pulse 81   Ht 162.6 cm (64\")   Wt (!) 147 kg (323 lb)   SpO2 95%   BMI 55.44 kg/m²   Pulmonary:      Effort: Pulmonary effort is normal.      Breath sounds: Normal breath sounds. No stridor. No wheezing. No rhonchi. No rales.   Cardiovascular:      PMI at left midclavicular line. Normal rate. Regular rhythm. Normal S1. Normal S2.       Murmurs: There is no murmur.      No gallop.  No " "click. No rub.   Pulses:     Intact distal pulses.   Edema:     Peripheral edema present.     Pretibial: bilateral 3+ edema of the pretibial area.     Ankle: bilateral 3+ edema of the ankle.     Feet: bilateral 3+ edema of the feet.          Lab Review  Lab Results   Component Value Date     (L) 03/09/2023    K 3.5 03/09/2023    CL 96 (L) 03/09/2023    BUN 19 03/09/2023    CREATININE 2.47 (H) 03/09/2023    GLUCOSE 98 03/09/2023    CALCIUM 8.5 (L) 03/09/2023    ALT 16 03/09/2023    ALKPHOS 86 03/09/2023     No results found for: \"CKTOTAL\"  Lab Results   Component Value Date    WBC 10.51 03/09/2023    HGB 12.0 03/09/2023    HCT 35.7 03/09/2023     03/09/2023     proBNP 137.4 which is mildly elevated.  Comprehensive metabolic panel  Order: [id number]  Component  Ref Range & Units 2 mo ago   Sodium  136 - 145 mmol/L 140   Potassium  3.5 - 5.1 mmol/L 3.6   Chloride  98 - 107 mmol/L 97 Low    Carbon Dioxide  22.0 - 29.0 mmol/L 30.0 High    Anion Gap  5 - 15 mmol/L 13   BUN  6.0 - 20.0 mg/dL 10.2   Creatinine  0.50 - 0.90 mg/dL 1.19 High    BUN/Creatinine Ratio 8.6   Glucose  70 - 105 mg/dL 103   Calcium  8.4 - 10.2 mg/dL 9.6   AST  32 U/L 18   ALT  33 U/L 7   Alkaline Phosphatase  35 - 104 U/L 83   Protein, Total  6.6 - 8.7 g/dL 7.4   Albumin  3.50 - 5.20 g/dL 4.10   Globulin  2.3 - 3.5 g/dL 3.3   A/G Ratio  1.1 - 2.1 1.2   Bilirubin, Total  1.00 mg/dL 0.56   Osmolality Calc  275 - 295 mosm/kg 270 Low    eGFR   54.4     Total cholesterol 185, triglyceride 95, HDL 47 .    Procedures       I personally viewed and interpreted the patient's LAB data         Assessment:     1. Chronic heart failure with preserved ejection fraction (HFpEF)    2. Coronary artery disease involving native coronary artery of native heart without angina pectoris    3. Essential hypertension    4. Mixed hyperlipidemia    5. Frequent unifocal PVCs, ventricular triplets    6. Bilateral lower extremity edema    7. Morbid obesity with " BMI of 50.0-59.9, adult          Plan:     Chronic heart failure with preserved ejection fraction.  Mildly elevated proBNP and mild bilateral lower extremity edema.  She is doing much better with Entresto which will be continued, amlodipine was decreased to 5 mg daily and she will continue Lasix 20 mg daily.  Lab work reviewed significant is a significantly better estimated GFR is 54.4    Hyperlipidemia  She is taking Lipitor LDL is 118.  Weight loss and diet restriction stressed.    Bilateral lower extremity edema  Norvasc dose was decreased to 5 mg daily.  Next visit we might stop Norvasc completely and increase Entresto.  Healthy lifestyle emphasized follow-up scheduled        No follow-ups on file.

## 2024-04-22 NOTE — LETTER
April 22, 2024     ADDIS Higuera  [address]  [place] [address]    Patient: Winnie Pittman   YOB: 1970   Date of Visit: 4/22/2024       Dear ADDIS Higuera    Winnie Pittman was in my office today. Below is a copy of my note.    If you have questions, please do not hesitate to call me. I look forward to following Winnie along with you.         Sincerely,        Alysha Arboleda MD        CC: No Recipients    subjective     Chief Complaint   Patient presents with   • Chronic Heart Failure   • Rash     Pt is asking about skin on lower right leg. She had cellulitis and it has caused her skin to become irritated       Problems Addressed This Visit  1. Chronic heart failure with preserved ejection fraction (HFpEF)    2. Coronary artery disease involving native coronary artery of native heart without angina pectoris    3. Essential hypertension    4. Mixed hyperlipidemia    5. Frequent unifocal PVCs, ventricular triplets    6. Bilateral lower extremity edema    7. Morbid obesity with BMI of 50.0-59.9, adult        History of Present Illness    Winnie is 53 years old white female who is here for cardiology follow-up.  She complains of mild bilateral lower extremity edema with stasis dermatitis on the right lower extremity.  Her proBNP was elevated she is doing much better with Entresto.  Blood pressure is very well-controlled we will decrease Norvasc to 5 mg daily and hopefully by next visit we could increase Entresto and DC Norvasc that might help her lower extremity edema.  She is also taking Lasix 20 mg daily  Renal functions are significantly better.    Hyperlipidemia on Lipitor therapy      Past Surgical History:   Procedure Laterality Date   • ANKLE SURGERY Right     Broken   • CARDIAC CATHETERIZATION N/A 10/27/2021    Procedure: Left Heart Cath;  Surgeon: Colt Smith MD;  Location: [facility] CATH INVASIVE LOCATION;  Service: Cardiology;  Laterality: N/A;   •   SECTION     •  SECTION     • HYSTERECTOMY      Partial   • TUBAL ABDOMINAL LIGATION       Family History   Problem Relation Age of Onset   • Heart disease Mother    • Obesity Mother    • Hypertension Mother    • Heart attack Mother    • Melanoma Mother    • No Known Problems Father    • Obesity Sister    • Obesity Maternal Grandmother    • Hypertension Maternal Grandmother    • Heart attack Maternal Grandmother    • No Known Problems Maternal Grandfather    • No Known Problems Paternal Grandmother    • No Known Problems Paternal Grandfather      Past Medical History:   Diagnosis Date   • Acute renal failure superimposed on stage 3 chronic kidney disease 2023   • Cancer    • Coronary artery disease 60 % LAD 2022   • Depression    • Hyperlipidemia    • Hypertension      Patient Active Problem List   Diagnosis   • Chest pain in adult   • Essential hypertension   • Mixed hyperlipidemia   • Generalized anxiety disorder   • Obesity   • Tobacco use   • Family history of premature coronary artery disease   • Multiple risk factors for coronary artery disease   • Palpitations   • Abnormal stress test   • Frequent unifocal PVCs, ventricular triplets   • Morbid obesity with BMI of 50.0-59.9, adult   • Coronary artery disease 60 % LAD, 2021.   • Bilateral lower extremity edema   • Stage 3a chronic kidney disease   • Chronic heart failure with preserved ejection fraction (HFpEF)       Social History     Tobacco Use   • Smoking status: Some Days     Current packs/day: 0.00     Average packs/day: 1 pack/day for 15.0 years (15.0 ttl pk-yrs)     Types: Cigarettes     Start date: 2009     Last attempt to quit: 2024     Years since quittin.2     Passive exposure: Past   • Smokeless tobacco: Never   Vaping Use   • Vaping status: Never Used   Substance Use Topics   • Alcohol use: No   • Drug use: Yes     Types: Benzodiazepines       No Known Allergies    Current Outpatient  Medications on File Prior to Visit   Medication Sig   • amLODIPine (NORVASC) 10 MG tablet Take 1 tablet by mouth Daily.   • Aspirin Adult Low Strength 81 MG EC tablet Take 1 tablet by mouth Daily.   • atorvastatin (LIPITOR) 20 MG tablet Take 1 tablet by mouth Daily.   • buPROPion XL (WELLBUTRIN XL) 150 MG 24 hr tablet Take 1 tablet by mouth Daily.   • busPIRone (BUSPAR) 10 MG tablet Take 1 tablet by mouth 3 (Three) Times a Day.   • cetirizine (zyrTEC) 10 MG tablet Take 1 tablet by mouth Daily.   • citalopram (CeleXA) 20 MG tablet Take 1 tablet by mouth Daily.   • escitalopram (LEXAPRO) 20 MG tablet Take 1 tablet by mouth Daily.   • Naloxegol Oxalate (MOVANTIK) 25 MG tablet Take 1 tablet by mouth Every Morning.   • omeprazole (priLOSEC) 20 MG capsule Take 1 capsule by mouth Daily.   • QUEtiapine (SEROquel) 100 MG tablet Take 1 tablet by mouth Every Night.   • sacubitril-valsartan (Entresto) 24-26 MG tablet Take 1 tablet by mouth 2 (Two) Times a Day.   • vitamin D (ERGOCALCIFEROL) 12976 units capsule capsule Take 1 capsule by mouth 1 (One) Time Per Week.   • furosemide (LASIX) 20 MG tablet Take 1 tablet by mouth Daily.     No current facility-administered medications on file prior to visit.         The following portions of the patient's history were reviewed and updated as appropriate: allergies, current medications, past family history, past medical history, past social history, past surgical history and problem list.    Review of Systems   Constitutional: Negative.   HENT: Negative.  Negative for congestion.    Eyes: Negative.    Cardiovascular:  Positive for leg swelling. Negative for chest pain, cyanosis, dyspnea on exertion, irregular heartbeat, near-syncope, orthopnea, palpitations, paroxysmal nocturnal dyspnea and syncope.   Respiratory: Negative.  Negative for shortness of breath.    Hematologic/Lymphatic: Negative.    Musculoskeletal: Negative.    Gastrointestinal: Negative.    Neurological: Negative.   "Negative for headaches.          Objective:     /82 (BP Location: Left arm, Patient Position: Sitting, Cuff Size: Adult)   Pulse 81   Ht 162.6 cm (64\")   Wt (!) 147 kg (323 lb)   SpO2 95%   BMI 55.44 kg/m²   Pulmonary:      Effort: Pulmonary effort is normal.      Breath sounds: Normal breath sounds. No stridor. No wheezing. No rhonchi. No rales.   Cardiovascular:      PMI at left midclavicular line. Normal rate. Regular rhythm. Normal S1. Normal S2.       Murmurs: There is no murmur.      No gallop.  No click. No rub.   Pulses:     Intact distal pulses.   Edema:     Peripheral edema present.     Pretibial: bilateral 3+ edema of the pretibial area.     Ankle: bilateral 3+ edema of the ankle.     Feet: bilateral 3+ edema of the feet.          Lab Review  Lab Results   Component Value Date     (L) 03/09/2023    K 3.5 03/09/2023    CL 96 (L) 03/09/2023    BUN 19 03/09/2023    CREATININE 2.47 (H) 03/09/2023    GLUCOSE 98 03/09/2023    CALCIUM 8.5 (L) 03/09/2023    ALT 16 03/09/2023    ALKPHOS 86 03/09/2023     No results found for: \"CKTOTAL\"  Lab Results   Component Value Date    WBC 10.51 03/09/2023    HGB 12.0 03/09/2023    HCT 35.7 03/09/2023     03/09/2023     proBNP 137.4 which is mildly elevated.  Comprehensive metabolic panel  Order: [id number]  Component  Ref Range & Units 2 mo ago   Sodium  136 - 145 mmol/L 140   Potassium  3.5 - 5.1 mmol/L 3.6   Chloride  98 - 107 mmol/L 97 Low    Carbon Dioxide  22.0 - 29.0 mmol/L 30.0 High    Anion Gap  5 - 15 mmol/L 13   BUN  6.0 - 20.0 mg/dL 10.2   Creatinine  0.50 - 0.90 mg/dL 1.19 High    BUN/Creatinine Ratio 8.6   Glucose  70 - 105 mg/dL 103   Calcium  8.4 - 10.2 mg/dL 9.6   AST  32 U/L 18   ALT  33 U/L 7   Alkaline Phosphatase  35 - 104 U/L 83   Protein, Total  6.6 - 8.7 g/dL 7.4   Albumin  3.50 - 5.20 g/dL 4.10   Globulin  2.3 - 3.5 g/dL 3.3   A/G Ratio  1.1 - 2.1 1.2   Bilirubin, Total  1.00 mg/dL 0.56   Osmolality Calc  275 - 295 mosm/kg 270 " Low    eGFR   54.4     Total cholesterol 185, triglyceride 95, HDL 47 .    Procedures       I personally viewed and interpreted the patient's LAB data         Assessment:     1. Chronic heart failure with preserved ejection fraction (HFpEF)    2. Coronary artery disease involving native coronary artery of native heart without angina pectoris    3. Essential hypertension    4. Mixed hyperlipidemia    5. Frequent unifocal PVCs, ventricular triplets    6. Bilateral lower extremity edema    7. Morbid obesity with BMI of 50.0-59.9, adult          Plan:     Chronic heart failure with preserved ejection fraction.  Mildly elevated proBNP and mild bilateral lower extremity edema.  She is doing much better with Entresto which will be continued, amlodipine was decreased to 5 mg daily and she will continue Lasix 20 mg daily.  Lab work reviewed significant is a significantly better estimated GFR is 54.4    Hyperlipidemia  She is taking Lipitor LDL is 118.  Weight loss and diet restriction stressed.    Bilateral lower extremity edema  Norvasc dose was decreased to 5 mg daily.  Next visit we might stop Norvasc completely and increase Entresto.  Healthy lifestyle emphasized follow-up scheduled        No follow-ups on file.

## 2024-04-22 NOTE — LETTER
April 22, 2024     ADDIS Higuera  [address]  [place] [address]    Patient: Winnie Pittman   YOB: 1970   Date of Visit: 4/22/2024       Dear ADDIS Higuera    Winnie Pittman was in my office today. Below is a copy of my note.    If you have questions, please do not hesitate to call me. I look forward to following Winnie along with you.         Sincerely,        Alysha Arboleda MD        CC: No Recipients    subjective     Chief Complaint   Patient presents with   • Chronic Heart Failure   • Rash     Pt is asking about skin on lower right leg. She had cellulitis and it has caused her skin to become irritated?       Problems Addressed This Visit  1. Chronic heart failure with preserved ejection fraction (HFpEF)    2. Coronary artery disease involving native coronary artery of native heart without angina pectoris    3. Essential hypertension    4. Mixed hyperlipidemia    5. Frequent unifocal PVCs, ventricular triplets    6. Bilateral lower extremity edema    7. Morbid obesity with BMI of 50.0-59.9, adult        History of Present Illness    Winnie is 53 years old white female who is here for cardiology follow-up.  She complains of mild bilateral lower extremity edema with stasis dermatitis on the right lower extremity.  Her proBNP was elevated she is doing much better with Entresto.  Blood pressure is very well-controlled we will decrease Norvasc to 5 mg daily and hopefully by next visit we could increase Entresto and DC Norvasc that might help her lower extremity edema.  She is also taking Lasix 20 mg daily  Renal functions are significantly better.    Hyperlipidemia on Lipitor therapy      Past Surgical History:   Procedure Laterality Date   • ANKLE SURGERY Right     Broken   • CARDIAC CATHETERIZATION N/A 10/27/2021    Procedure: Left Heart Cath;  Surgeon: Colt Smith MD;  Location: [facility] CATH INVASIVE LOC[facility];  Service: Cardiology;  Laterality: N/A;   •   SECTION     •  SECTION     • HYSTERECTOMY      Partial   • TUBAL ABDOMINAL LIGATION       Family History   Problem Relation Age of Onset   • Heart disease Mother    • Obesity Mother    • Hypertension Mother    • Heart attack Mother    • Melanoma Mother    • No Known Problems Father    • Obesity Sister    • Obesity Maternal Grandmother    • Hypertension Maternal Grandmother    • Heart attack Maternal Grandmother    • No Known Problems Maternal Grandfather    • No Known Problems Paternal Grandmother    • No Known Problems Paternal Grandfather      Past Medical History:   Diagnosis Date   • Acute renal failure superimposed on stage 3 chronic kidney disease 2023   • Cancer    • Coronary artery disease 60 % LAD 2022   • Depression    • Hyperlipidemia    • Hypertension      Patient Active Problem List   Diagnosis   • Chest pain in adult   • Essential hypertension   • Mixed hyperlipidemia   • Generalized anxiety disorder   • Obesity   • Tobacco use   • Family history of premature coronary artery disease   • Multiple risk factors for coronary artery disease   • Palpitations   • Abnormal stress test   • Frequent unifocal PVCs, ventricular triplets   • Morbid obesity with BMI of 50.0-59.9, adult   • Coronary artery disease 60 % LAD, 2021.   • Bilateral lower extremity edema   • Stage 3a chronic kidney disease   • Chronic heart failure with preserved ejection fraction (HFpEF)       Social History     Tobacco Use   • Smoking status: Some Days     Current packs/day: 0.00     Average packs/day: 1 pack/day for 15.0 years (15.0 ttl pk-yrs)     Types: Cigarettes     Start date: 2009     Last attempt to quit: 2024     Years since quittin.2     Passive exposure: Past   • Smokeless tobacco: Never   Vaping Use   • Vaping status: Never Used   Substance Use Topics   • Alcohol use: No   • Drug use: Yes     Types: Benzodiazepines       No Known Allergies    Current Outpatient  Medications on File Prior to Visit   Medication Sig   • amLODIPine (NORVASC) 10 MG tablet Take 1 tablet by mouth Daily.   • Aspirin Adult Low Strength 81 MG EC tablet Take 1 tablet by mouth Daily.   • atorvastatin (LIPITOR) 20 MG tablet Take 1 tablet by mouth Daily.   • buPROPion XL (WELLBUTRIN XL) 150 MG 24 hr tablet Take 1 tablet by mouth Daily.   • busPIRone (BUSPAR) 10 MG tablet Take 1 tablet by mouth 3 (Three) Times a Day.   • cetirizine (zyrTEC) 10 MG tablet Take 1 tablet by mouth Daily.   • citalopram (CeleXA) 20 MG tablet Take 1 tablet by mouth Daily.   • escitalopram (LEXAPRO) 20 MG tablet Take 1 tablet by mouth Daily.   • Naloxegol Oxalate (MOVANTIK) 25 MG tablet Take 1 tablet by mouth Every Morning.   • omeprazole (priLOSEC) 20 MG capsule Take 1 capsule by mouth Daily.   • QUEtiapine (SEROquel) 100 MG tablet Take 1 tablet by mouth Every Night.   • sacubitril-valsartan (Entresto) 24-26 MG tablet Take 1 tablet by mouth 2 (Two) Times a Day.   • vitamin D (ERGOCALCIFEROL) 62829 units capsule capsule Take 1 capsule by mouth 1 (One) Time Per Week.   • furosemide (LASIX) 20 MG tablet Take 1 tablet by mouth Daily.     No current facility-administered medications on file prior to visit.         The following portions of the patient's history were reviewed and updated as appropriate: allergies, current medications, past family history, past medical history, past social history, past surgical history and problem list.    Review of Systems   Constitutional: Negative.   HENT: Negative.  Negative for congestion.    Eyes: Negative.    Cardiovascular:  Positive for leg swelling. Negative for chest pain, cyanosis, dyspnea on exertion, irregular heartbeat, near-syncope, orthopnea, palpitations, paroxysmal nocturnal dyspnea and syncope.   Respiratory: Negative.  Negative for shortness of breath.    Hematologic/Lymphatic: Negative.    Musculoskeletal: Negative.    Gastrointestinal: Negative.    Neurological: Negative.   "Negative for headaches.          Objective:     /82 (BP Location: Left arm, Patient Position: Sitting, Cuff Size: Adult)   Pulse 81   Ht 162.6 cm (64\")   Wt (!) 147 kg (323 lb)   SpO2 95%   BMI 55.44 kg/m²   Pulmonary:      Effort: Pulmonary effort is normal.      Breath sounds: Normal breath sounds. No stridor. No wheezing. No rhonchi. No rales.   Cardiovascular:      PMI at left midclavicular line. Normal rate. Regular rhythm. Normal S1. Normal S2.       Murmurs: There is no murmur.      No gallop.  No click. No rub.   Pulses:     Intact distal pulses.   Edema:     Peripheral edema present.     Pretibial: bilateral 3+ edema of the pretibial area.     Ankle: bilateral 3+ edema of the ankle.     Feet: bilateral 3+ edema of the feet.          Lab Review  Lab Results   Component Value Date     (L) 03/09/2023    K 3.5 03/09/2023    CL 96 (L) 03/09/2023    BUN 19 03/09/2023    CREATININE 2.47 (H) 03/09/2023    GLUCOSE 98 03/09/2023    CALCIUM 8.5 (L) 03/09/2023    ALT 16 03/09/2023    ALKPHOS 86 03/09/2023     No results found for: \"CKTOTAL\"  Lab Results   Component Value Date    WBC 10.51 03/09/2023    HGB 12.0 03/09/2023    HCT 35.7 03/09/2023     03/09/2023     proBNP 137.4 which is mildly elevated.  Comprehensive metabolic panel  Order: [id number]  Component  Ref Range & Units 2 mo ago   Sodium  136 - 145 mmol/L 140   Potassium  3.5 - 5.1 mmol/L 3.6   Chloride  98 - 107 mmol/L 97 Low    Carbon Dioxide  22.0 - 29.0 mmol/L 30.0 High    Anion Gap  5 - 15 mmol/L 13   BUN  6.0 - 20.0 mg/dL 10.2   Creatinine  0.50 - 0.90 mg/dL 1.19 High    BUN/Creatinine Ratio 8.6   Glucose  70 - 105 mg/dL 103   Calcium  8.4 - 10.2 mg/dL 9.6   AST  <=32 U/L 18   ALT  <=33 U/L 7   Alkaline Phosphatase  35 - 104 U/L 83   Protein, Total  6.6 - 8.7 g/dL 7.4   Albumin  3.50 - 5.20 g/dL 4.10   Globulin  2.3 - 3.5 g/dL 3.3   A/G Ratio  1.1 - 2.1 1.2   Bilirubin, Total  <=1.00 mg/dL 0.56   Osmolality Calc  275 - 295 mosm/kg " 270 Low    eGFR  mL/min/{1.73_m2} 54.4     Total cholesterol 185, triglyceride 95, HDL 47 .    Procedures       I personally viewed and interpreted the patient's LAB data         Assessment:     1. Chronic heart failure with preserved ejection fraction (HFpEF)    2. Coronary artery disease involving native coronary artery of native heart without angina pectoris    3. Essential hypertension    4. Mixed hyperlipidemia    5. Frequent unifocal PVCs, ventricular triplets    6. Bilateral lower extremity edema    7. Morbid obesity with BMI of 50.0-59.9, adult          Plan:              No follow-ups on file.

## 2024-05-22 RX ORDER — SACUBITRIL AND VALSARTAN 24; 26 MG/1; MG/1
1 TABLET, FILM COATED ORAL 2 TIMES DAILY
Qty: 60 TABLET | Refills: 0 | Status: SHIPPED | OUTPATIENT
Start: 2024-05-22

## 2024-05-22 NOTE — TELEPHONE ENCOUNTER
"Caller: Winnie Pittman \"Santa\"    Relationship: Self    Best call back number: [phone]     Requested Prescriptions:   Requested Prescriptions     Pending Prescriptions Disp Refills    sacubitril-valsartan (Entresto) 24-26 MG tablet 60 tablet 0     Sig: Take 1 tablet by mouth 2 (Two) Times a Day.        Pharmacy where request should be sent: [facility], [address] [phone] [facility] [phone]      Last office visit with prescribing clinician: 4/22/2024   Last telemedicine visit with prescribing clinician: Visit date not found   Next office visit with prescribing clinician: 7/17/2024     Additional details provided by patient: HAS 1 PILL LEFT     Does the patient have less than a 3 day supply:  [x] Yes  [] No    Would you like a call back once the refill request has been completed: [] Yes [] No    If the office needs to give you a call back, can they leave a voicemail: [] Yes [] No    Keven Sy Rep   05/22/24 11:18 EDT     "

## 2025-04-24 RX ORDER — SACUBITRIL AND VALSARTAN 24; 26 MG/1; MG/1
1 TABLET, FILM COATED ORAL EVERY 12 HOURS SCHEDULED
Qty: 60 TABLET | Refills: 1 | OUTPATIENT
Start: 2025-04-24

## (undated) DEVICE — ST INF PRI SMRTSTE 20DRP 2VLV 24ML 117

## (undated) DEVICE — RADIFOCUS OPTITORQUE ANGIOGRAPHIC CATHETER: Brand: OPTITORQUE

## (undated) DEVICE — DRAPE, RADIAL, STERILE: Brand: MEDLINE

## (undated) DEVICE — PAD, DEFIB, ADULT, RADIOTRANS, ZOLL: Brand: MEDLINE

## (undated) DEVICE — PK CATH CARD 70

## (undated) DEVICE — GLIDESHEATH SLENDER STAINLESS STEEL KIT: Brand: GLIDESHEATH SLENDER

## (undated) DEVICE — RUNWAY RADL W/TOP PAD

## (undated) DEVICE — LN SMPL O2 NASL/ORL SMART/CAPNOLINE PLS A/

## (undated) DEVICE — MTS LHK BAPTIST CORBIN: Brand: NAMIC

## (undated) DEVICE — GW INQW FIX/CORE PTFE J/3MM .035 260CM

## (undated) DEVICE — ST EXT IV SMARTSITE 2VLV SP M LL 5ML IV1

## (undated) DEVICE — DRSNG SURESITE WNDW 4X4.5

## (undated) DEVICE — TR BAND RADIAL ARTERY COMPRESSION DEVICE: Brand: TR BAND